# Patient Record
Sex: FEMALE | Race: WHITE | NOT HISPANIC OR LATINO | Employment: OTHER | URBAN - METROPOLITAN AREA
[De-identification: names, ages, dates, MRNs, and addresses within clinical notes are randomized per-mention and may not be internally consistent; named-entity substitution may affect disease eponyms.]

---

## 2019-06-20 ENCOUNTER — HOSPITAL ENCOUNTER (EMERGENCY)
Facility: HOSPITAL | Age: 39
Discharge: HOME | End: 2019-06-20
Attending: FAMILY MEDICINE
Payer: MEDICAID

## 2019-06-20 VITALS
SYSTOLIC BLOOD PRESSURE: 114 MMHG | WEIGHT: 130.94 LBS | DIASTOLIC BLOOD PRESSURE: 60 MMHG | RESPIRATION RATE: 18 BRPM | HEART RATE: 83 BPM | TEMPERATURE: 98 F | HEIGHT: 67 IN | OXYGEN SATURATION: 96 % | BODY MASS INDEX: 20.55 KG/M2

## 2019-06-20 DIAGNOSIS — Z20.2 STD EXPOSURE: ICD-10-CM

## 2019-06-20 DIAGNOSIS — J02.9 ACUTE SORE THROAT: Primary | ICD-10-CM

## 2019-06-20 DIAGNOSIS — Z11.4 ENCOUNTER FOR SCREENING FOR HIV: ICD-10-CM

## 2019-06-20 LAB — HIV1+2 IGG SERPL QL IA.RAPID: NEGATIVE

## 2019-06-20 PROCEDURE — 86703 HIV-1/HIV-2 1 RESULT ANTBDY: CPT

## 2019-06-20 PROCEDURE — 99283 EMERGENCY DEPT VISIT LOW MDM: CPT

## 2019-06-20 PROCEDURE — 86592 SYPHILIS TEST NON-TREP QUAL: CPT

## 2019-06-20 RX ORDER — HYDROXYZINE PAMOATE 25 MG/1
25 CAPSULE ORAL 4 TIMES DAILY
Qty: 30 CAPSULE | Refills: 0 | Status: ON HOLD | OUTPATIENT
Start: 2019-06-20 | End: 2022-02-03 | Stop reason: HOSPADM
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
## 2019-06-25 LAB — RPR SER QL: NORMAL

## 2022-01-25 ENCOUNTER — HOSPITAL ENCOUNTER (INPATIENT)
Facility: HOSPITAL | Age: 42
LOS: 10 days | Discharge: HOME | DRG: 885 | End: 2022-02-04
Attending: STUDENT IN AN ORGANIZED HEALTH CARE EDUCATION/TRAINING PROGRAM | Admitting: STUDENT IN AN ORGANIZED HEALTH CARE EDUCATION/TRAINING PROGRAM
Payer: MEDICAID

## 2022-01-25 ENCOUNTER — HOSPITAL ENCOUNTER (EMERGENCY)
Facility: HOSPITAL | Age: 42
Discharge: PSYCHIATRIC OR SUBSTANCE USE FACILITY | End: 2022-01-25
Attending: EMERGENCY MEDICINE
Payer: MEDICAID

## 2022-01-25 VITALS
DIASTOLIC BLOOD PRESSURE: 80 MMHG | BODY MASS INDEX: 21.19 KG/M2 | HEART RATE: 90 BPM | SYSTOLIC BLOOD PRESSURE: 142 MMHG | HEIGHT: 67 IN | WEIGHT: 135 LBS | OXYGEN SATURATION: 99 % | RESPIRATION RATE: 16 BRPM | TEMPERATURE: 98 F

## 2022-01-25 DIAGNOSIS — F29 PSYCHOSIS: ICD-10-CM

## 2022-01-25 DIAGNOSIS — F25.9 SCHIZOAFFECTIVE DISORDER, UNSPECIFIED TYPE: ICD-10-CM

## 2022-01-25 DIAGNOSIS — F25.0 SCHIZOAFFECTIVE DISORDER, BIPOLAR TYPE: Primary | ICD-10-CM

## 2022-01-25 DIAGNOSIS — E87.6 HYPOKALEMIA: ICD-10-CM

## 2022-01-25 DIAGNOSIS — F29 PSYCHOSIS, UNSPECIFIED PSYCHOSIS TYPE: ICD-10-CM

## 2022-01-25 DIAGNOSIS — F30.10 MANIC BEHAVIOR: ICD-10-CM

## 2022-01-25 DIAGNOSIS — Z00.8 MEDICAL CLEARANCE FOR PSYCHIATRIC ADMISSION: Primary | ICD-10-CM

## 2022-01-25 LAB
ALBUMIN SERPL BCP-MCNC: 3.9 G/DL (ref 3.5–5.2)
ALP SERPL-CCNC: 90 U/L (ref 55–135)
ALT SERPL W/O P-5'-P-CCNC: 14 U/L (ref 10–44)
AMPHET+METHAMPHET UR QL: NEGATIVE
ANION GAP SERPL CALC-SCNC: 12 MMOL/L (ref 8–16)
APAP SERPL-MCNC: <3 UG/ML (ref 10–20)
AST SERPL-CCNC: 27 U/L (ref 10–40)
BARBITURATES UR QL SCN>200 NG/ML: ABNORMAL
BASOPHILS # BLD AUTO: 0.04 K/UL (ref 0–0.2)
BASOPHILS NFR BLD: 0.5 % (ref 0–1.9)
BENZODIAZ UR QL SCN>200 NG/ML: NEGATIVE
BILIRUB SERPL-MCNC: 0.2 MG/DL (ref 0.1–1)
BUN SERPL-MCNC: 6 MG/DL (ref 6–20)
BZE UR QL SCN: NEGATIVE
CALCIUM SERPL-MCNC: 9 MG/DL (ref 8.7–10.5)
CANNABINOIDS UR QL SCN: ABNORMAL
CHLORIDE SERPL-SCNC: 104 MMOL/L (ref 95–110)
CO2 SERPL-SCNC: 24 MMOL/L (ref 23–29)
CREAT SERPL-MCNC: 0.7 MG/DL (ref 0.5–1.4)
CREAT UR-MCNC: 289 MG/DL (ref 15–325)
DIFFERENTIAL METHOD: ABNORMAL
EOSINOPHIL # BLD AUTO: 0.1 K/UL (ref 0–0.5)
EOSINOPHIL NFR BLD: 0.6 % (ref 0–8)
ERYTHROCYTE [DISTWIDTH] IN BLOOD BY AUTOMATED COUNT: 13.4 % (ref 11.5–14.5)
EST. GFR  (AFRICAN AMERICAN): >60 ML/MIN/1.73 M^2
EST. GFR  (NON AFRICAN AMERICAN): >60 ML/MIN/1.73 M^2
ETHANOL SERPL-MCNC: <10 MG/DL
GLUCOSE SERPL-MCNC: 122 MG/DL (ref 70–110)
HCT VFR BLD AUTO: 33.7 % (ref 37–48.5)
HCV AB SERPL QL IA: NEGATIVE
HGB BLD-MCNC: 11.3 G/DL (ref 12–16)
HIV 1+2 AB+HIV1 P24 AG SERPL QL IA: NEGATIVE
IMM GRANULOCYTES # BLD AUTO: 0.03 K/UL (ref 0–0.04)
IMM GRANULOCYTES NFR BLD AUTO: 0.3 % (ref 0–0.5)
LYMPHOCYTES # BLD AUTO: 2.4 K/UL (ref 1–4.8)
LYMPHOCYTES NFR BLD: 27.3 % (ref 18–48)
MCH RBC QN AUTO: 31.3 PG (ref 27–31)
MCHC RBC AUTO-ENTMCNC: 33.5 G/DL (ref 32–36)
MCV RBC AUTO: 93 FL (ref 82–98)
METHADONE UR QL SCN>300 NG/ML: NEGATIVE
MONOCYTES # BLD AUTO: 0.6 K/UL (ref 0.3–1)
MONOCYTES NFR BLD: 7 % (ref 4–15)
NEUTROPHILS # BLD AUTO: 5.6 K/UL (ref 1.8–7.7)
NEUTROPHILS NFR BLD: 64.3 % (ref 38–73)
NRBC BLD-RTO: 0 /100 WBC
OPIATES UR QL SCN: NEGATIVE
PCP UR QL SCN>25 NG/ML: NEGATIVE
PLATELET # BLD AUTO: 226 K/UL (ref 150–450)
PMV BLD AUTO: 10 FL (ref 9.2–12.9)
POTASSIUM SERPL-SCNC: 3 MMOL/L (ref 3.5–5.1)
PROT SERPL-MCNC: 6.2 G/DL (ref 6–8.4)
RBC # BLD AUTO: 3.61 M/UL (ref 4–5.4)
SALICYLATES SERPL-MCNC: <5 MG/DL (ref 15–30)
SARS-COV-2 RDRP RESP QL NAA+PROBE: NEGATIVE
SODIUM SERPL-SCNC: 140 MMOL/L (ref 136–145)
TOXICOLOGY INFORMATION: ABNORMAL
TSH SERPL DL<=0.005 MIU/L-ACNC: 2.37 UIU/ML (ref 0.4–4)
WBC # BLD AUTO: 8.71 K/UL (ref 3.9–12.7)

## 2022-01-25 PROCEDURE — 63600175 PHARM REV CODE 636 W HCPCS: Performed by: EMERGENCY MEDICINE

## 2022-01-25 PROCEDURE — 63700000 PHARM REV CODE 250 ALT 637 W/O HCPCS: Performed by: EMERGENCY MEDICINE

## 2022-01-25 PROCEDURE — 99205 OFFICE O/P NEW HI 60 MIN: CPT | Mod: AF,HB,95, | Performed by: PSYCHIATRY & NEUROLOGY

## 2022-01-25 PROCEDURE — 84443 ASSAY THYROID STIM HORMONE: CPT | Performed by: EMERGENCY MEDICINE

## 2022-01-25 PROCEDURE — 85025 COMPLETE CBC W/AUTO DIFF WBC: CPT | Performed by: EMERGENCY MEDICINE

## 2022-01-25 PROCEDURE — U0002 COVID-19 LAB TEST NON-CDC: HCPCS | Performed by: EMERGENCY MEDICINE

## 2022-01-25 PROCEDURE — 80143 DRUG ASSAY ACETAMINOPHEN: CPT | Performed by: EMERGENCY MEDICINE

## 2022-01-25 PROCEDURE — 80179 DRUG ASSAY SALICYLATE: CPT | Performed by: EMERGENCY MEDICINE

## 2022-01-25 PROCEDURE — 11400000 HC PSYCH PRIVATE ROOM

## 2022-01-25 PROCEDURE — 99205 PR OFFICE/OUTPT VISIT, NEW, LEVL V, 60-74 MIN: ICD-10-PCS | Mod: AF,HB,95, | Performed by: PSYCHIATRY & NEUROLOGY

## 2022-01-25 PROCEDURE — 86803 HEPATITIS C AB TEST: CPT | Performed by: EMERGENCY MEDICINE

## 2022-01-25 PROCEDURE — 80307 DRUG TEST PRSMV CHEM ANLYZR: CPT | Performed by: EMERGENCY MEDICINE

## 2022-01-25 PROCEDURE — 96372 THER/PROPH/DIAG INJ SC/IM: CPT

## 2022-01-25 PROCEDURE — 80053 COMPREHEN METABOLIC PANEL: CPT | Performed by: EMERGENCY MEDICINE

## 2022-01-25 PROCEDURE — 82077 ASSAY SPEC XCP UR&BREATH IA: CPT | Performed by: EMERGENCY MEDICINE

## 2022-01-25 PROCEDURE — 87389 HIV-1 AG W/HIV-1&-2 AB AG IA: CPT | Performed by: EMERGENCY MEDICINE

## 2022-01-25 PROCEDURE — 99285 EMERGENCY DEPT VISIT HI MDM: CPT | Mod: 25

## 2022-01-25 RX ORDER — DIPHENHYDRAMINE HYDROCHLORIDE 50 MG/ML
50 INJECTION INTRAMUSCULAR; INTRAVENOUS EVERY 4 HOURS PRN
Status: DISCONTINUED | OUTPATIENT
Start: 2022-01-25 | End: 2022-02-04 | Stop reason: HOSPADM

## 2022-01-25 RX ORDER — HYDROXYZINE HYDROCHLORIDE 25 MG/1
50 TABLET, FILM COATED ORAL NIGHTLY PRN
Status: DISCONTINUED | OUTPATIENT
Start: 2022-01-25 | End: 2022-01-26

## 2022-01-25 RX ORDER — LORAZEPAM 2 MG/ML
2 INJECTION INTRAMUSCULAR EVERY 4 HOURS PRN
Status: DISCONTINUED | OUTPATIENT
Start: 2022-01-25 | End: 2022-02-04 | Stop reason: HOSPADM

## 2022-01-25 RX ORDER — HALOPERIDOL 5 MG/1
5 TABLET ORAL EVERY 4 HOURS PRN
Status: DISCONTINUED | OUTPATIENT
Start: 2022-01-25 | End: 2022-01-28

## 2022-01-25 RX ORDER — ACETAMINOPHEN 325 MG/1
650 TABLET ORAL EVERY 6 HOURS PRN
Status: DISCONTINUED | OUTPATIENT
Start: 2022-01-25 | End: 2022-02-04 | Stop reason: HOSPADM

## 2022-01-25 RX ORDER — POTASSIUM CHLORIDE 20 MEQ/15ML
20 SOLUTION ORAL ONCE
Status: COMPLETED | OUTPATIENT
Start: 2022-01-25 | End: 2022-01-25

## 2022-01-25 RX ORDER — THIAMINE HCL 100 MG
100 TABLET ORAL DAILY
Status: DISCONTINUED | OUTPATIENT
Start: 2022-01-26 | End: 2022-02-04 | Stop reason: HOSPADM

## 2022-01-25 RX ORDER — DIPHENHYDRAMINE HCL 50 MG
50 CAPSULE ORAL EVERY 4 HOURS PRN
Status: DISCONTINUED | OUTPATIENT
Start: 2022-01-25 | End: 2022-02-04 | Stop reason: HOSPADM

## 2022-01-25 RX ORDER — FOLIC ACID 1 MG/1
1 TABLET ORAL DAILY
Status: DISCONTINUED | OUTPATIENT
Start: 2022-01-26 | End: 2022-02-04 | Stop reason: HOSPADM

## 2022-01-25 RX ORDER — MAG HYDROX/ALUMINUM HYD/SIMETH 200-200-20
30 SUSPENSION, ORAL (FINAL DOSE FORM) ORAL
Status: DISCONTINUED | OUTPATIENT
Start: 2022-01-25 | End: 2022-02-04 | Stop reason: HOSPADM

## 2022-01-25 RX ORDER — LORAZEPAM 2 MG/ML
1 INJECTION INTRAMUSCULAR
Status: COMPLETED | OUTPATIENT
Start: 2022-01-25 | End: 2022-01-25

## 2022-01-25 RX ORDER — LORAZEPAM 1 MG/1
2 TABLET ORAL EVERY 4 HOURS PRN
Status: DISCONTINUED | OUTPATIENT
Start: 2022-01-25 | End: 2022-02-04 | Stop reason: HOSPADM

## 2022-01-25 RX ORDER — HALOPERIDOL 5 MG/ML
5 INJECTION INTRAMUSCULAR
Status: COMPLETED | OUTPATIENT
Start: 2022-01-25 | End: 2022-01-25

## 2022-01-25 RX ORDER — ADHESIVE BANDAGE
30 BANDAGE TOPICAL DAILY PRN
Status: DISCONTINUED | OUTPATIENT
Start: 2022-01-25 | End: 2022-02-04 | Stop reason: HOSPADM

## 2022-01-25 RX ORDER — HALOPERIDOL 5 MG/ML
5 INJECTION INTRAMUSCULAR EVERY 4 HOURS PRN
Status: DISCONTINUED | OUTPATIENT
Start: 2022-01-25 | End: 2022-01-28

## 2022-01-25 RX ORDER — ZIPRASIDONE MESYLATE 20 MG/ML
20 INJECTION, POWDER, LYOPHILIZED, FOR SOLUTION INTRAMUSCULAR
Status: COMPLETED | OUTPATIENT
Start: 2022-01-25 | End: 2022-01-25

## 2022-01-25 RX ORDER — IBUPROFEN 200 MG
1 TABLET ORAL DAILY PRN
Status: DISCONTINUED | OUTPATIENT
Start: 2022-01-25 | End: 2022-01-27

## 2022-01-25 RX ADMIN — LORAZEPAM 1 MG: 2 INJECTION INTRAMUSCULAR; INTRAVENOUS at 01:01

## 2022-01-25 RX ADMIN — POTASSIUM CHLORIDE 20 MEQ: 20 SOLUTION ORAL at 01:01

## 2022-01-25 RX ADMIN — ZIPRASIDONE MESYLATE 20 MG: 20 INJECTION, POWDER, LYOPHILIZED, FOR SOLUTION INTRAMUSCULAR at 08:01

## 2022-01-25 RX ADMIN — HALOPERIDOL LACTATE 5 MG: 5 INJECTION, SOLUTION INTRAMUSCULAR at 01:01

## 2022-01-25 NOTE — ED PROVIDER NOTES
SCRIBE #1 NOTE: I, Kelsie Hendrickson, am scribing for, and in the presence of, Roni Ruiz MD. I have scribed the entire note.      History      Chief Complaint   Patient presents with    Psychiatric Evaluation     Found in manic state at [place] [facility] per EMS       Review of patient's allergies indicates:   Allergen Reactions    Latex, natural rubber Anaphylaxis    Sulfa (sulfonamide antibiotics)         HPI   HPI    2022, 8:29 AM   History obtained from the patient and EMS  HPI/ROS limited secondary to physiatric disorder      History of Present Illness: Umm Green is a 41 y.o. female patient with a PMHx of HLD, mental disorder, and schizoaffective disorder who presents to the Emergency Department for a psychiatric evaluation. Per EMS, the pt was found at a [place] [facility] in a manic state. The pt is rambling when she is asked questions.      Arrival mode: EMS    PCP: Mo David MD       Past Medical History:  Past Medical History:   Diagnosis Date    Hyperlipidemia     Mental disorder     Schizoaffective disorder        Past Surgical History:  Past Surgical History:   Procedure Laterality Date     SECTION      x1    MULTIPLE TOOTH EXTRACTIONS      X5         Family History:  No family history on file.    Social History:  Social History     Tobacco Use    Smoking status: Former Smoker     Quit date: 2014     Years since quittin.7    Smokeless tobacco: Never Used   Substance and Sexual Activity    Alcohol use: No    Drug use: No    Sexual activity: Yes     Partners: Male     Birth control/protection: None       ROS   Review of Systems   Unable to perform ROS: Psychiatric disorder     Physical Exam      Initial Vitals [22 0829]   BP Pulse Resp Temp SpO2   137/79 100 20 98.5 °F (36.9 °C) 100 %      MAP       --          Physical Exam  Nursing Notes and Vital Signs Reviewed.  Constitutional: Patient is in no acute distress. Well-developed and well-nourished.  Head:  "Atraumatic. Normocephalic.  Eyes: PERRL. EOM intact. Conjunctivae are not pale. No scleral icterus.  ENT: Mucous membranes are moist. Oropharynx is clear and symmetric.    Neck: Supple. Full ROM. No lymphadenopathy.  Cardiovascular: Regular rate. Regular rhythm. No murmurs, rubs, or gallops. Distal pulses are 2+ and symmetric.  Pulmonary/Chest: No respiratory distress. Clear to auscultation bilaterally. No wheezing or rales.  Abdominal: Soft and non-distended.  There is no tenderness.  No rebound, guarding, or rigidity.   Musculoskeletal: Moves all extremities. No obvious deformities. No edema.  Skin: Warm and dry.  Neurological:  Alert and awake.  Pressured speech.  No acute focal neurological deficits are appreciated.  Psychiatric: The pt is manic. Pressured speech. Pt has flight of ideas. Pt has tangential thought process.    ED Course    Procedures  ED Vital Signs:  Vitals:    01/25/22 0829   BP: 137/79   Pulse: 100   Resp: 20   Temp: 98.5 °F (36.9 °C)   TempSrc: Oral   SpO2: 100%   Weight: 61.2 kg (135 lb)   Height: 5' 7" (1.702 m)       Abnormal Lab Results:  Labs Reviewed   CBC W/ AUTO DIFFERENTIAL - Abnormal; Notable for the following components:       Result Value    RBC 3.61 (*)     Hemoglobin 11.3 (*)     Hematocrit 33.7 (*)     MCH 31.3 (*)     All other components within normal limits    Narrative:     Release to patient->Immediate   COMPREHENSIVE METABOLIC PANEL - Abnormal; Notable for the following components:    Potassium 3.0 (*)     Glucose 122 (*)     All other components within normal limits    Narrative:     Release to patient->Immediate   DRUG SCREEN PANEL, URINE EMERGENCY - Abnormal; Notable for the following components:    Barbiturate Screen, Ur Presumptive Positive (*)     THC Presumptive Positive (*)     All other components within normal limits    Narrative:     Specimen Source->Urine   SALICYLATE LEVEL - Abnormal; Notable for the following components:    Salicylate Lvl <5.0 (*)     All " other components within normal limits    Narrative:     Release to patient->Immediate   ACETAMINOPHEN LEVEL - Abnormal; Notable for the following components:    Acetaminophen (Tylenol), Serum <3.0 (*)     All other components within normal limits    Narrative:     Release to patient->Immediate   TSH    Narrative:     Release to patient->Immediate   ALCOHOL,MEDICAL (ETHANOL)    Narrative:     Release to patient->Immediate   HIV 1 / 2 ANTIBODY    Narrative:     Release to patient->Immediate   HEPATITIS C ANTIBODY    Narrative:     Release to patient->Immediate   HEP C VIRUS HOLD SPECIMEN        All Lab Results:  Results for orders placed or performed during the hospital encounter of 01/25/22   CBC Auto Differential   Result Value Ref Range    WBC 8.71 3.90 - 12.70 K/uL    RBC 3.61 (L) 4.00 - 5.40 M/uL    Hemoglobin 11.3 (L) 12.0 - 16.0 g/dL    Hematocrit 33.7 (L) 37.0 - 48.5 %    MCV 93 82 - 98 fL    MCH 31.3 (H) 27.0 - 31.0 pg    MCHC 33.5 32.0 - 36.0 g/dL    RDW 13.4 11.5 - 14.5 %    Platelets 226 150 - 450 K/uL    MPV 10.0 9.2 - 12.9 fL    Immature Granulocytes 0.3 0.0 - 0.5 %    Gran # (ANC) 5.6 1.8 - 7.7 K/uL    Immature Grans (Abs) 0.03 0.00 - 0.04 K/uL    Lymph # 2.4 1.0 - 4.8 K/uL    Mono # 0.6 0.3 - 1.0 K/uL    Eos # 0.1 0.0 - 0.5 K/uL    Baso # 0.04 0.00 - 0.20 K/uL    nRBC 0 0 /100 WBC    Gran % 64.3 38.0 - 73.0 %    Lymph % 27.3 18.0 - 48.0 %    Mono % 7.0 4.0 - 15.0 %    Eosinophil % 0.6 0.0 - 8.0 %    Basophil % 0.5 0.0 - 1.9 %    Differential Method Automated    Comprehensive Metabolic Panel   Result Value Ref Range    Sodium 140 136 - 145 mmol/L    Potassium 3.0 (L) 3.5 - 5.1 mmol/L    Chloride 104 95 - 110 mmol/L    CO2 24 23 - 29 mmol/L    Glucose 122 (H) 70 - 110 mg/dL    BUN 6 6 - 20 mg/dL    Creatinine 0.7 0.5 - 1.4 mg/dL    Calcium 9.0 8.7 - 10.5 mg/dL    Total Protein 6.2 6.0 - 8.4 g/dL    Albumin 3.9 3.5 - 5.2 g/dL    Total Bilirubin 0.2 0.1 - 1.0 mg/dL    Alkaline Phosphatase 90 55 - 135 U/L     AST 27 10 - 40 U/L    ALT 14 10 - 44 U/L    Anion Gap 12 8 - 16 mmol/L    eGFR if African American >60 >60 mL/min/1.73 m^2    eGFR if non African American >60 >60 mL/min/1.73 m^2   TSH   Result Value Ref Range    TSH 2.365 0.400 - 4.000 uIU/mL   Drug screen panel, in-house   Result Value Ref Range    Benzodiazepines Negative Negative    Methadone metabolites Negative Negative    Cocaine (Metab.) Negative Negative    Opiate Scrn, Ur Negative Negative    Barbiturate Screen, Ur Presumptive Positive (A) Negative    Amphetamine Screen, Ur Negative Negative    THC Presumptive Positive (A) Negative    Phencyclidine Negative Negative    Creatinine, Urine 289.0 15.0 - 325.0 mg/dL    Toxicology Information SEE COMMENT    Ethanol   Result Value Ref Range    Alcohol, Serum <10 <10 mg/dL   Salicylate Level   Result Value Ref Range    Salicylate Lvl <5.0 (L) 15.0 - 30.0 mg/dL   Acetaminophen Level   Result Value Ref Range    Acetaminophen (Tylenol), Serum <3.0 (L) 10.0 - 20.0 ug/mL   HIV 1/2 Ag/Ab (4th Gen)   Result Value Ref Range    HIV 1/2 Ag/Ab Negative Negative   Hepatitis C Antibody   Result Value Ref Range    Hepatitis C Ab Negative Negative         Imaging Results:  Imaging Results    None                 The Emergency Provider reviewed the vital signs and test results, which are outlined above.    ED Discussion   8:28 AM: The PEC hold has been issued by Dr. Ruiz at this time for psychosis and manic behavior.    12:03 PM: Pt has been medically cleared by Dr. Ruiz at this time. Reassessed pt at this time. Pt is resting comfortably and appears in no acute distress. There are no psychiatric services offered at this facility. D/w pt all pertinent ED information and plan to transfer to psychiatric facility for psychiatric treatment. Pt verbalizes understanding. Patient being transferred by [facility] for ongoing personal protection en route. Pt has been made aware of all risks and benefits associated with transfer,  including but not limited to death, MVC, loss of vital signs, and/or permanent disability. Benefits include ability to be treated at an inpatient psychiatric facility. Pt will be transported by personnel trained in CPR and CPI. Patient understands that there could be unforeseen motor vehicle accidents, inclement weather, or loss of vital signs that could result in potential death or permanent disability. All questions and complaints have been addressed at this time. Pt condition is stable at this time and is clear to transfer to psychiatric facility at this time.            ED Medication(s):  Medications   potassium chloride 10% oral solution 20 mEq (has no administration in time range)   ziprasidone injection 20 mg (20 mg Intramuscular Given 1/25/22 0854)           New Prescriptions    No medications on file         Medical Decision Making    Medical Decision Making:   Clinical Tests:   Lab Tests: Ordered and Reviewed           Scribe Attestation:   Scribe #1: I performed the above scribed service and the documentation accurately describes the services I performed. I attest to the accuracy of the note.    Attending:   Physician Attestation Statement for Scribe #1: I, Roni Ruiz MD, personally performed the services described in this documentation, as scribed by Kelsie Hendrickson, in my presence, and it is both accurate and complete.          Clinical Impression       ICD-10-CM ICD-9-CM   1. Medical clearance for psychiatric admission  Z00.8 V70.8   2. Psychosis, unspecified psychosis type  F29 298.9   3. Manic behavior  F30.10 296.00   4. Hypokalemia  E87.6 276.8       Disposition:   Disposition: Transferred  Condition: Stable         Roni Ruiz MD  01/25/22 6296

## 2022-01-25 NOTE — CONSULTS
"[facility]  Psychiatry  Telepsychiatry Consult Note    Please see previous notes:    Patient agreeable to consultation via telepsychiatry.    Tele-Consultation from Psychiatry started: 1/25/2022 at 11:37 AM  The chief complaint leading to psychiatric consultation is: psychosis  This consultation was requested by Dr Ruiz, the Emergency Department attending physician.  The location of the consulting psychiatrist is [place].  The patient location is  [facility] EMERGENCY DEPARTMENT   The patient arrived at the ED at: 0819    Also present with the patient at the time of the consultation: sitter    Patient Identification:   Umm Green is a 41 y.o. female.    Patient information was obtained from patient and past medical records.  Patient presented voluntarily to the Emergency Department by ambulance where the patient received see Ambulance Run Sheet prior to arrival.    Inpatient consult to Telemedicine - Psychiatry  Consult performed by: Tona Connor MD  Consult ordered by: Roni Ruiz MD        Subjective:     History of Present Illness:  Per ED MD: " Umm Green is a 41 y.o. female patient with a PMHx of HLD, mental disorder, and schizoaffective disorder who presents to the Emergency Department for a psychiatric evaluation. Per EMS, the pt was found at a [place] [facility] in a manic state. "    Patient is a 40 y/o female with PMH HLD and past psychiatric h/o schizoaffective d/o per chart BIB EMS for the above. Chart reviewed, received ziprasidone 20 mg IM @ 0854. UDS + barbiturates, THC. Per staff pt was escalating and unable to be redirected safely. On interview, pt sleeping, required 2 staff to awaken for interview. Speech largely unintellible, continually makes sign of the cross. States "I'm scared in this world...Please protect me and save me in this earth...I was not happy I was sad." Also says "I live in the house the one that's the [place] lam[place]." Became extremely tearful and unable to " continue talking, offered to call emergency contact Marissa Green for information and she agreed. Pt then laid back down and closed her eyes. Despite multiple attempts, the comprehensive psychiatric assessment was limited due to pts acute smiley / psychosis. Due to the patient's inability to logically or linearly participate in the psychiatric assessment, I reached out to:  (# on the EMR facesheet)   Marissa Green Step parent [phone]   Went to , left message.    Per extensive chart review, no psychiatric presentations noted, but bizarre behavior noted by other clinicians at times, OBGYN noted dx of schizoaffective d/o throughout pregnancy care.    Psychiatric History:   Previous Psychiatric Hospitalizations: unable to assess  Previous Medication Trials: per chart Ativan, vistaril, Ambien  Previous Suicide Attempts: unable to assess  History of Violence: unable to assess  History of Depression: unable to assess  History of Smiley: unable to assess  History of Auditory/Visual Hallucination unable to assess  History of Delusions: unable to assess  Outpatient psychiatrist (current & past): unable to assess    Substance Abuse History:  Tobacco:unable to assess  Alcohol: unable to assess  Illicit Substances:UDS + barbiturates, THC  Detox/Rehab: unable to assess    Legal History: Past charges/incarcerations: unable to assess     Family Psychiatric History: unable to assess      Social History:  Developmental/Childhood:unable to assess  *Education:unable to assess  Employment Status/Finances:unable to assess   Relationship Status/Sexual Orientation: unable to assess  Children: unable to assess  Housing Status: unable to assess    history:  unable to assess  Access to gun: unable to assess  Voodoo:unable to assess  Recreational activities:unable to assess    Psychiatric Mental Status Exam:  Arousal: awake  Sensorium/Orientation: oriented to self  Behavior/Cooperation: reluctant to participate   Speech:  "soft, mumbled, pressured  Language: grossly intact  Mood: "sad"  Affect: tearful  Thought Process: flight of ideas  Thought Content:   Auditory hallucinations: unable to assess  Visual hallucinations: unable to assess  Paranoia: unable to assess  Delusions:  hyperreligious  Suicidal ideation: unable to assess  Homicidal ideation: unable to assess  Attention/Concentration:  Easily distracted  Memory:    Recent: unable to assess   Remote: unable to assess  Fund of Knowledge: unable to assess  Abstract reasoning: unable to assess  Insight:poor  Judgment: poor      Past Medical History:   Past Medical History:   Diagnosis Date    Hyperlipidemia     Mental disorder     Schizoaffective disorder       Laboratory Data:   Labs Reviewed   CBC W/ AUTO DIFFERENTIAL - Abnormal; Notable for the following components:       Result Value    RBC 3.61 (*)     Hemoglobin 11.3 (*)     Hematocrit 33.7 (*)     MCH 31.3 (*)     All other components within normal limits    Narrative:     Release to patient->Immediate   COMPREHENSIVE METABOLIC PANEL - Abnormal; Notable for the following components:    Potassium 3.0 (*)     Glucose 122 (*)     All other components within normal limits    Narrative:     Release to patient->Immediate   DRUG SCREEN PANEL, URINE EMERGENCY - Abnormal; Notable for the following components:    Barbiturate Screen, Ur Presumptive Positive (*)     THC Presumptive Positive (*)     All other components within normal limits    Narrative:     Specimen Source->Urine   TSH    Narrative:     Release to patient->Immediate   HIV 1 / 2 ANTIBODY    Narrative:     Release to patient->Immediate   HEPATITIS C ANTIBODY    Narrative:     Release to patient->Immediate   ALCOHOL,MEDICAL (ETHANOL)   SALICYLATE LEVEL   ACETAMINOPHEN LEVEL   HEP C VIRUS HOLD SPECIMEN       Neurological History:  Seizures: Unable to assess  Head trauma: Unable to assess    Allergies:   Review of patient's allergies indicates:   Allergen Reactions    " Latex, natural rubber Anaphylaxis    Sulfa (sulfonamide antibiotics)        Medications in ER:   Medications   ziprasidone injection 20 mg (20 mg Intramuscular Given 1/25/22 7102)       Medications at home: Unable to assess. No fills last 12 mo per .    Assessment - Diagnosis - Goals:       IMPRESSION:   Schizoaffective d/o    RECOMMENDATIONS:     DISPOSITION: Once medically cleared;    Seek Involuntary Inpatient Psychiatric admission for stabilization of acute psychiatric symptoms and until a safe disposition plan is enacted. The pt was informed that the pt will be transferred to an Inpt unit per ED placement team.     PSYCHIATRIC MEDICATIONS  · Scheduled-defer to inpatient psychiatry  · PRN- Haldol 5mg + Benadryl 50mg + Ativan 2mg PO/IM q 6 for psychotic agitation. Would check EKG for QTc prior to administering.    LEGAL   Continue PEC because pt is gravely disabled. Please provide with 1:1 sitter.         Time with patient and chart: 30      More than 50% of the time was spent counseling/coordinating care    Consulting clinician was informed of the encounter and consult note.    Consultation ended: 1/25/2022 at 12:09 PM      Tona Connor MD   Psychiatry  [facility]

## 2022-01-26 PROBLEM — F12.10 MARIJUANA ABUSE: Status: ACTIVE | Noted: 2022-01-26

## 2022-01-26 LAB
ALBUMIN SERPL BCP-MCNC: 3.4 G/DL (ref 3.5–5.2)
ALP SERPL-CCNC: 95 U/L (ref 55–135)
ALT SERPL W/O P-5'-P-CCNC: 24 U/L (ref 10–44)
ANION GAP SERPL CALC-SCNC: 4 MMOL/L (ref 8–16)
AST SERPL-CCNC: 32 U/L (ref 10–40)
BILIRUB SERPL-MCNC: 0.2 MG/DL (ref 0.1–1)
BUN SERPL-MCNC: 9 MG/DL (ref 6–20)
CALCIUM SERPL-MCNC: 8.8 MG/DL (ref 8.7–10.5)
CHLORIDE SERPL-SCNC: 106 MMOL/L (ref 95–110)
CHOLEST SERPL-MCNC: 156 MG/DL (ref 120–199)
CHOLEST/HDLC SERPL: 2.8 {RATIO} (ref 2–5)
CO2 SERPL-SCNC: 31 MMOL/L (ref 23–29)
CREAT SERPL-MCNC: 0.7 MG/DL (ref 0.5–1.4)
EST. GFR  (AFRICAN AMERICAN): >60 ML/MIN/1.73 M^2
EST. GFR  (NON AFRICAN AMERICAN): >60 ML/MIN/1.73 M^2
ESTIMATED AVG GLUCOSE: 105 MG/DL (ref 68–131)
GLUCOSE SERPL-MCNC: 100 MG/DL (ref 70–110)
HBA1C MFR BLD: 5.3 % (ref 4–5.6)
HDLC SERPL-MCNC: 55 MG/DL (ref 40–75)
HDLC SERPL: 35.3 % (ref 20–50)
LDLC SERPL CALC-MCNC: 83 MG/DL (ref 63–159)
NONHDLC SERPL-MCNC: 101 MG/DL
POTASSIUM SERPL-SCNC: 3.6 MMOL/L (ref 3.5–5.1)
PROT SERPL-MCNC: 6.5 G/DL (ref 6–8.4)
SODIUM SERPL-SCNC: 141 MMOL/L (ref 136–145)
TRIGL SERPL-MCNC: 90 MG/DL (ref 30–150)

## 2022-01-26 PROCEDURE — 80053 COMPREHEN METABOLIC PANEL: CPT | Performed by: STUDENT IN AN ORGANIZED HEALTH CARE EDUCATION/TRAINING PROGRAM

## 2022-01-26 PROCEDURE — 99223 1ST HOSP IP/OBS HIGH 75: CPT | Mod: AF,HB,, | Performed by: STUDENT IN AN ORGANIZED HEALTH CARE EDUCATION/TRAINING PROGRAM

## 2022-01-26 PROCEDURE — 25000003 PHARM REV CODE 250: Performed by: INTERNAL MEDICINE

## 2022-01-26 PROCEDURE — 36415 COLL VENOUS BLD VENIPUNCTURE: CPT | Performed by: STUDENT IN AN ORGANIZED HEALTH CARE EDUCATION/TRAINING PROGRAM

## 2022-01-26 PROCEDURE — 90833 PR PSYCHOTHERAPY W/PATIENT W/E&M, 30 MIN (ADD ON): ICD-10-PCS | Mod: AF,HB,, | Performed by: STUDENT IN AN ORGANIZED HEALTH CARE EDUCATION/TRAINING PROGRAM

## 2022-01-26 PROCEDURE — 11400000 HC PSYCH PRIVATE ROOM

## 2022-01-26 PROCEDURE — 80061 LIPID PANEL: CPT | Performed by: STUDENT IN AN ORGANIZED HEALTH CARE EDUCATION/TRAINING PROGRAM

## 2022-01-26 PROCEDURE — 25000003 PHARM REV CODE 250: Performed by: STUDENT IN AN ORGANIZED HEALTH CARE EDUCATION/TRAINING PROGRAM

## 2022-01-26 PROCEDURE — 90833 PSYTX W PT W E/M 30 MIN: CPT | Mod: AF,HB,, | Performed by: STUDENT IN AN ORGANIZED HEALTH CARE EDUCATION/TRAINING PROGRAM

## 2022-01-26 PROCEDURE — S4991 NICOTINE PATCH NONLEGEND: HCPCS | Performed by: INTERNAL MEDICINE

## 2022-01-26 PROCEDURE — 99223 PR INITIAL HOSPITAL CARE,LEVL III: ICD-10-PCS | Mod: AF,HB,, | Performed by: STUDENT IN AN ORGANIZED HEALTH CARE EDUCATION/TRAINING PROGRAM

## 2022-01-26 PROCEDURE — 83036 HEMOGLOBIN GLYCOSYLATED A1C: CPT | Performed by: STUDENT IN AN ORGANIZED HEALTH CARE EDUCATION/TRAINING PROGRAM

## 2022-01-26 RX ORDER — CARBOXYMETHYLCELLULOSE SODIUM 5 MG/ML
2 SOLUTION/ DROPS OPHTHALMIC
Status: DISCONTINUED | OUTPATIENT
Start: 2022-01-26 | End: 2022-01-26

## 2022-01-26 RX ORDER — HYDROXYZINE HYDROCHLORIDE 25 MG/1
50 TABLET, FILM COATED ORAL EVERY 4 HOURS PRN
Status: DISCONTINUED | OUTPATIENT
Start: 2022-01-26 | End: 2022-01-29

## 2022-01-26 RX ORDER — TETRAHYDROZOLINE HCL 0.05 %
1 DROPS OPHTHALMIC (EYE) EVERY 8 HOURS PRN
Status: DISCONTINUED | OUTPATIENT
Start: 2022-01-26 | End: 2022-01-26

## 2022-01-26 RX ORDER — ARIPIPRAZOLE 5 MG/1
5 TABLET ORAL DAILY
Status: DISCONTINUED | OUTPATIENT
Start: 2022-01-26 | End: 2022-01-27

## 2022-01-26 RX ADMIN — THIAMINE HCL TAB 100 MG 100 MG: 100 TAB at 08:01

## 2022-01-26 RX ADMIN — ARIPIPRAZOLE 5 MG: 5 TABLET ORAL at 10:01

## 2022-01-26 RX ADMIN — FOLIC ACID 1 MG: 1 TABLET ORAL at 08:01

## 2022-01-26 RX ADMIN — ACETAMINOPHEN 650 MG: 325 TABLET ORAL at 08:01

## 2022-01-26 RX ADMIN — NICOTINE 1 PATCH: 14 PATCH TRANSDERMAL at 01:01

## 2022-01-26 RX ADMIN — THERA TABS 1 TABLET: TAB at 08:01

## 2022-01-26 RX ADMIN — HYDROXYZINE HYDROCHLORIDE 50 MG: 25 TABLET, FILM COATED ORAL at 08:01

## 2022-01-26 NOTE — PLAN OF CARE
"POC reviewed this shift and is on going. Patient is calm, cooperative, quiet, and pacing.  Denies Suicidal Ideation, Homicidal Ideation, Auditory Hallucinations, Visual Hallucinations, Tactile Hallucinations, Gustatory Hallucinations, and Delusions. Patient has been out on the floor and pacing. Patient comes up to the nurses station every 15 minutes asking the same question, "can I call my friend to come pick me up". This RN has to tell her, you are not leaving today and she says ok. Pt continues to be medication compliant and staff will continue to monitor pt closely while on the unit.    "

## 2022-01-26 NOTE — H&P
[facility]) Hospital Medicine  History & Physical    Patient Name: Umm Green  MRN: [id number]  Patient Class: IP- Psych  Admission Date: 2022  Attending Physician: Romaine Akhtar III, MD   Primary Care Provider: Mo David MD         Patient information was obtained from ER records.     Subjective:     Principal Problem:<principal problem not specified>    Chief Complaint: No chief complaint on file.       HPI:   Chief Complaint   Patient presents with    Psychiatric Evaluation       Found in manic state at [facility] per EMS              Review of patient's allergies indicates:   Allergen Reactions    Latex, natural rubber Anaphylaxis    Sulfa (sulfonamide antibiotics)           HPI   HPI     2022, 8:29 AM   History obtained from the patient and EMS  HPI/ROS limited secondary to physiatric disorder                  History of Present Illness: Umm Green is a 41 y.o. female patient with a PMHx of HLD, mental disorder, and schizoaffective disorder who presents to the Emergency Department for a psychiatric evaluation. Per EMS, the pt was found at a [place] [facility] in a manic state. The pt is rambling when she is asked questions.        Arrival mode: EMS     PCP: Mo David MD          Past Medical History:   Diagnosis Date    Hyperlipidemia     Mental disorder     Schizoaffective disorder        Past Surgical History:   Procedure Laterality Date     SECTION      x1    MULTIPLE TOOTH EXTRACTIONS      X5       Review of patient's allergies indicates:   Allergen Reactions    Latex, natural rubber Anaphylaxis    Sulfa (sulfonamide antibiotics)        Current Facility-Administered Medications on File Prior to Encounter   Medication    [COMPLETED] haloperidol lactate injection 5 mg    [COMPLETED] lorazepam injection 1 mg    [COMPLETED] potassium chloride 10% oral solution 20 mEq     Current Outpatient Medications on File Prior to Encounter    Medication Sig    hydrOXYzine pamoate (VISTARIL) 25 MG Cap Take 1 capsule (25 mg total) by mouth 4 (four) times daily.    lorazepam (ATIVAN) 2 MG Tab Take 1 tablet (2 mg total) by mouth every 12 (twelve) hours as needed.     Family History    None       Tobacco Use    Smoking status: Former Smoker     Quit date: 2014     Years since quittin.7    Smokeless tobacco: Never Used   Substance and Sexual Activity    Alcohol use: No    Drug use: No    Sexual activity: Yes     Partners: Male     Birth control/protection: None     Review of Systems   Constitutional: Negative for fatigue and fever.   HENT: Negative for congestion, ear pain and sore throat.    Eyes: Negative for pain and discharge.   Respiratory: Negative for cough, shortness of breath and wheezing.    Gastrointestinal: Negative for abdominal pain, constipation, diarrhea, nausea and vomiting.   Endocrine: Negative for cold intolerance and heat intolerance.   Genitourinary: Negative for difficulty urinating, dysuria and frequency.   Musculoskeletal: Negative for arthralgias.   Allergic/Immunologic: Negative for environmental allergies.   Neurological: Negative for dizziness, tremors and seizures.   Psychiatric/Behavioral: Positive for behavioral problems, dysphoric mood and sleep disturbance.   All other systems reviewed and are negative.    Objective:     Vital Signs (Most Recent):  Temp: 98 °F (36.7 °C) (22 0740)  Pulse: (!) 57 (22 0740)  Resp: 20 (22 0740)  BP: (!) 106/53 (22 0740)  SpO2: 99 % (22 0729) Vital Signs (24h Range):  Temp:  [97.9 °F (36.6 °C)-98.2 °F (36.8 °C)] 98 °F (36.7 °C)  Pulse:  [57-90] 57  Resp:  [16-20] 20  SpO2:  [97 %-99 %] 99 %  BP: (106-142)/(53-80) 106/53     Weight: 60.8 kg (134 lb)  Body mass index is 20.99 kg/m².    Physical Exam  Vitals and nursing note reviewed.   Constitutional:       Appearance: Normal appearance.   HENT:      Head: Normocephalic and atraumatic.      Nose: Nose  normal.      Mouth/Throat:      Mouth: Mucous membranes are moist.      Pharynx: Oropharynx is clear.   Eyes:      Extraocular Movements: Extraocular movements intact.      Conjunctiva/sclera: Conjunctivae normal.      Pupils: Pupils are equal, round, and reactive to light.   Cardiovascular:      Rate and Rhythm: Normal rate and regular rhythm.      Pulses: Normal pulses.      Heart sounds: Normal heart sounds.   Pulmonary:      Effort: Pulmonary effort is normal.      Breath sounds: Normal breath sounds.   Abdominal:      General: Abdomen is flat. Bowel sounds are normal.      Palpations: Abdomen is soft.   Musculoskeletal:         General: Normal range of motion.      Cervical back: Normal range of motion and neck supple.   Skin:     General: Skin is warm and dry.      Capillary Refill: Capillary refill takes less than 2 seconds.      Comments: No rashes on limited skin exam.   Neurological:      General: No focal deficit present.      Mental Status: She is alert and oriented to person, place, and time.      Cranial Nerves: No cranial nerve deficit.      Comments: I Olfactory:  Sense of smell intact    II Optic:  Pupils equal round react to light.  Vision intact.    III, IV, VI, Ocular motor, Trochlear, Abducens:  Extraocular movements intact    V Trigeminal:  Facial sensation intact facial sensation intact,, muscles of mastication intact muscles of mastication intact, corneal reflex intact, corneal reflex intact    VII Facial:  Muscles of facial expression intact     VIII Vestibular cochlear: Hearing intact vestibular cochlear: Hearing intact    IX Glossopharyngeal:  Gag reflex intact.  Tasting intact.     X Vagus:  Gag reflex intact.    XI Spinal Accessory:  Shoulder shrug intact.  Head rotation intact.    XII Hypoglossal:  Tongue movements intact.     Psychiatric:      Comments: Patient appears manic with pressured speech and FOI           CRANIAL NERVES     CN III, IV, VI   Pupils are equal, round, and  reactive to light.       Significant Labs: All pertinent labs within the past 24 hours have been reviewed.    Significant Imaging: I have reviewed all pertinent imaging results/findings within the past 24 hours.    Assessment/Plan:     Marijuana abuse  Recommend cessation      Schizoaffective disorder  To be admitted to our inpatient psychiatric unit for further evaluation and management.        Tobacco smoking complicating pregnancy in second trimester  Patient counseled on health consequences associated with tobacco abuse.  She endorses understanding.  Nicotine patch will be provided if necessary as to avoid cravings.          VTE Risk Mitigation (From admission, onward)    None             Cornelio Watkins Jr, MD  Department of Hospital Medicine   [facility] ([facility])

## 2022-01-26 NOTE — ASSESSMENT & PLAN NOTE
Patient counseled on health consequences associated with tobacco abuse.  She endorses understanding.  Nicotine patch will be provided if necessary as to avoid cravings.

## 2022-01-26 NOTE — PLAN OF CARE
41 year old female admitted from [facility] [place], [place]  With a history of Schizoaffective disorder and a medical history of Hyperlipidemia. Pt. Has pressured speech upon arrival and c/o auditory and visual hallucinations at times.   UDS  noted to be positive for Barbiturate and THC. Pt. Denied any c/o suicidal or visual hallucinations. Pt. C/o feeling sleepy at this time and was unable to stay awake at this time. Pt. Was oriented to unit, room and unit rules.
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
## 2022-01-26 NOTE — PLAN OF CARE
Step parent, Marissa  [phone]  Left voicemail.   Returned phone call. 15:20    Per Marissa, she haven't seen patient in awhile. She is patient's step mother. States that patient is a sweet lady normally. Her mom is  and unaware where her father is.     Patient has SSI and money from death of mom was in a trust with an .       Lorie Weiner (Aunt) Relative [phone]    Per Lorie, Patient contacted her the other night and started screaming and talking; unable to understand patient. Unsure who can help patient, haven't seen patient in two years. Patient is always manic, the times she has been around her. In the past, patient has had her own home. Patient had a child and gave her up for adoption. Diagnosed as bi-polar and doesn't take medications. Prior to patient's mother passing, she would try to ensure that she gets medications. Bizarre behaviors such as climbing on shelves in [employer] and jumped, but patient is afraid of heights.      Patient has lost several relationship due to bizarre behaviors.    Patient has lots of dogs and cats living in her home. Patient is currently in a relationship with someone by the name of Ramesh Luna. Patient ex- is Esteban Vásquez, but is no longer together with him.     Patient needs help and states that she feels that she's unable to live alone if her boyfriend isn't there.   Patient receives disability and someone with stated is over her monthly funds that she receives every so often since the death of her mom.

## 2022-01-26 NOTE — PLAN OF CARE
Patient signed collateral contact form to speak with (Lorie Orellana, and Marissa) listed on contact list.

## 2022-01-26 NOTE — SUBJECTIVE & OBJECTIVE
Past Medical History:   Diagnosis Date    Hyperlipidemia     Mental disorder     Schizoaffective disorder        Past Surgical History:   Procedure Laterality Date     SECTION      x1    MULTIPLE TOOTH EXTRACTIONS      X5       Review of patient's allergies indicates:   Allergen Reactions    Latex, natural rubber Anaphylaxis    Sulfa (sulfonamide antibiotics)        Current Facility-Administered Medications on File Prior to Encounter   Medication    [COMPLETED] haloperidol lactate injection 5 mg    [COMPLETED] lorazepam injection 1 mg    [COMPLETED] potassium chloride 10% oral solution 20 mEq     Current Outpatient Medications on File Prior to Encounter   Medication Sig    hydrOXYzine pamoate (VISTARIL) 25 MG Cap Take 1 capsule (25 mg total) by mouth 4 (four) times daily.    lorazepam (ATIVAN) 2 MG Tab Take 1 tablet (2 mg total) by mouth every 12 (twelve) hours as needed.     Family History    None       Tobacco Use    Smoking status: Former Smoker     Quit date: 2014     Years since quittin.7    Smokeless tobacco: Never Used   Substance and Sexual Activity    Alcohol use: No    Drug use: No    Sexual activity: Yes     Partners: Male     Birth control/protection: None     Review of Systems   Constitutional: Negative for fatigue and fever.   HENT: Negative for congestion, ear pain and sore throat.    Eyes: Negative for pain and discharge.   Respiratory: Negative for cough, shortness of breath and wheezing.    Gastrointestinal: Negative for abdominal pain, constipation, diarrhea, nausea and vomiting.   Endocrine: Negative for cold intolerance and heat intolerance.   Genitourinary: Negative for difficulty urinating, dysuria and frequency.   Musculoskeletal: Negative for arthralgias.   Allergic/Immunologic: Negative for environmental allergies.   Neurological: Negative for dizziness, tremors and seizures.   Psychiatric/Behavioral: Positive for behavioral problems, dysphoric mood and  sleep disturbance.   All other systems reviewed and are negative.    Objective:     Vital Signs (Most Recent):  Temp: 98 °F (36.7 °C) (01/26/22 0740)  Pulse: (!) 57 (01/26/22 0740)  Resp: 20 (01/26/22 0740)  BP: (!) 106/53 (01/26/22 0740)  SpO2: 99 % (01/26/22 0729) Vital Signs (24h Range):  Temp:  [97.9 °F (36.6 °C)-98.2 °F (36.8 °C)] 98 °F (36.7 °C)  Pulse:  [57-90] 57  Resp:  [16-20] 20  SpO2:  [97 %-99 %] 99 %  BP: (106-142)/(53-80) 106/53     Weight: 60.8 kg (134 lb)  Body mass index is 20.99 kg/m².    Physical Exam  Vitals and nursing note reviewed.   Constitutional:       Appearance: Normal appearance.   HENT:      Head: Normocephalic and atraumatic.      Nose: Nose normal.      Mouth/Throat:      Mouth: Mucous membranes are moist.      Pharynx: Oropharynx is clear.   Eyes:      Extraocular Movements: Extraocular movements intact.      Conjunctiva/sclera: Conjunctivae normal.      Pupils: Pupils are equal, round, and reactive to light.   Cardiovascular:      Rate and Rhythm: Normal rate and regular rhythm.      Pulses: Normal pulses.      Heart sounds: Normal heart sounds.   Pulmonary:      Effort: Pulmonary effort is normal.      Breath sounds: Normal breath sounds.   Abdominal:      General: Abdomen is flat. Bowel sounds are normal.      Palpations: Abdomen is soft.   Musculoskeletal:         General: Normal range of motion.      Cervical back: Normal range of motion and neck supple.   Skin:     General: Skin is warm and dry.      Capillary Refill: Capillary refill takes less than 2 seconds.      Comments: No rashes on limited skin exam.   Neurological:      General: No focal deficit present.      Mental Status: She is alert and oriented to person, place, and time.      Cranial Nerves: No cranial nerve deficit.      Comments: I Olfactory:  Sense of smell intact    II Optic:  Pupils equal round react to light.  Vision intact.    III, IV, VI, Ocular motor, Trochlear, Abducens:  Extraocular movements  intact    V Trigeminal:  Facial sensation intact facial sensation intact,, muscles of mastication intact muscles of mastication intact, corneal reflex intact, corneal reflex intact    VII Facial:  Muscles of facial expression intact     VIII Vestibular cochlear: Hearing intact vestibular cochlear: Hearing intact    IX Glossopharyngeal:  Gag reflex intact.  Tasting intact.     X Vagus:  Gag reflex intact.    XI Spinal Accessory:  Shoulder shrug intact.  Head rotation intact.    XII Hypoglossal:  Tongue movements intact.     Psychiatric:      Comments: Patient appears manic with pressured speech and FOI           CRANIAL NERVES     CN III, IV, VI   Pupils are equal, round, and reactive to light.       Significant Labs: All pertinent labs within the past 24 hours have been reviewed.    Significant Imaging: I have reviewed all pertinent imaging results/findings within the past 24 hours.

## 2022-01-26 NOTE — ED NOTES
Belongings given to [facility]:   Black tennis shoes  Black jacket  Top   Pants  $160, car keys, debit card

## 2022-01-26 NOTE — PLAN OF CARE
Patient given Challenging Negative thoughts worksheet.Intervention focused on questionnaires to help assess irrational thoughts.     Plan  Follow up with patient on 1/27/22.

## 2022-01-26 NOTE — HPI
Chief Complaint   Patient presents with    Psychiatric Evaluation       Found in manic state at [place] [facility] per EMS              Review of patient's allergies indicates:   Allergen Reactions    Latex, natural rubber Anaphylaxis    Sulfa (sulfonamide antibiotics)           HPI   HPI     1/25/2022, 8:29 AM   History obtained from the patient and EMS  HPI/ROS limited secondary to physiatric disorder                  History of Present Illness: Umm Green is a 41 y.o. female patient with a PMHx of HLD, mental disorder, and schizoaffective disorder who presents to the Emergency Department for a psychiatric evaluation. Per EMS, the pt was found at [facility] in a manic state. The pt is rambling when she is asked questions.        Arrival mode: EMS     PCP: Mo David MD

## 2022-01-27 PROCEDURE — 90833 PSYTX W PT W E/M 30 MIN: CPT | Mod: AF,HB,, | Performed by: STUDENT IN AN ORGANIZED HEALTH CARE EDUCATION/TRAINING PROGRAM

## 2022-01-27 PROCEDURE — 25000003 PHARM REV CODE 250: Performed by: INTERNAL MEDICINE

## 2022-01-27 PROCEDURE — 11400000 HC PSYCH PRIVATE ROOM

## 2022-01-27 PROCEDURE — 90833 PR PSYCHOTHERAPY W/PATIENT W/E&M, 30 MIN (ADD ON): ICD-10-PCS | Mod: AF,HB,, | Performed by: STUDENT IN AN ORGANIZED HEALTH CARE EDUCATION/TRAINING PROGRAM

## 2022-01-27 PROCEDURE — 99233 PR SUBSEQUENT HOSPITAL CARE,LEVL III: ICD-10-PCS | Mod: AF,HB,, | Performed by: STUDENT IN AN ORGANIZED HEALTH CARE EDUCATION/TRAINING PROGRAM

## 2022-01-27 PROCEDURE — 99233 SBSQ HOSP IP/OBS HIGH 50: CPT | Mod: AF,HB,, | Performed by: STUDENT IN AN ORGANIZED HEALTH CARE EDUCATION/TRAINING PROGRAM

## 2022-01-27 PROCEDURE — 25000003 PHARM REV CODE 250: Performed by: STUDENT IN AN ORGANIZED HEALTH CARE EDUCATION/TRAINING PROGRAM

## 2022-01-27 RX ORDER — ARIPIPRAZOLE 10 MG/1
10 TABLET ORAL DAILY
Status: DISCONTINUED | OUTPATIENT
Start: 2022-01-28 | End: 2022-01-28

## 2022-01-27 RX ORDER — LORAZEPAM 0.5 MG/1
0.5 TABLET ORAL ONCE AS NEEDED
Status: COMPLETED | OUTPATIENT
Start: 2022-01-27 | End: 2022-01-27

## 2022-01-27 RX ORDER — MICONAZOLE NITRATE 2 %
2 CREAM (GRAM) TOPICAL EVERY 4 HOURS PRN
Status: DISCONTINUED | OUTPATIENT
Start: 2022-01-27 | End: 2022-02-04 | Stop reason: HOSPADM

## 2022-01-27 RX ORDER — TRAZODONE HYDROCHLORIDE 100 MG/1
100 TABLET ORAL NIGHTLY
Status: DISCONTINUED | OUTPATIENT
Start: 2022-01-27 | End: 2022-01-29

## 2022-01-27 RX ADMIN — TRAZODONE HYDROCHLORIDE 100 MG: 100 TABLET ORAL at 08:01

## 2022-01-27 RX ADMIN — HYDROXYZINE HYDROCHLORIDE 50 MG: 25 TABLET, FILM COATED ORAL at 08:01

## 2022-01-27 RX ADMIN — THERA TABS 1 TABLET: TAB at 08:01

## 2022-01-27 RX ADMIN — ACETAMINOPHEN 650 MG: 325 TABLET ORAL at 02:01

## 2022-01-27 RX ADMIN — ARIPIPRAZOLE 5 MG: 5 TABLET ORAL at 08:01

## 2022-01-27 RX ADMIN — NICOTINE POLACRILEX 2 MG: 2 GUM, CHEWING BUCCAL at 09:01

## 2022-01-27 RX ADMIN — FOLIC ACID 1 MG: 1 TABLET ORAL at 08:01

## 2022-01-27 RX ADMIN — THIAMINE HCL TAB 100 MG 100 MG: 100 TAB at 08:01

## 2022-01-27 RX ADMIN — LORAZEPAM 0.5 MG: 0.5 TABLET ORAL at 11:01

## 2022-01-27 NOTE — PROGRESS NOTES
"PSYCHIATRY DAILY INPATIENT PROGRESS NOTE  SUBSEQUENT HOSPITAL VISIT    ENCOUNTER DATE: 1/27/2022  SITE: [facility]    DATE OF ADMISSION: 1/25/2022 11:30 PM  LENGTH OF STAY: 2 days      CHIEF COMPLAINT   Umm Green is a 41 y.o. female, seen during daily stone rounds on the inpatient unit.  Umm Green presented with the chief complaint of psychosis      The patient was seen and examined. The chart was reviewed.     Reviewed notes from Rns, MD and SW and labs from the last 24 hours.    The patient's case was discussed with the treatment team/care providers today including Rns, CTRS and SW    Staff reports some behavioral or management issues.     The patient has been compliant with treatment.      Subjective 01/27/2022       Today the patient reports "the medication is being tampered with."    Continues to report delusions about needing to support veterans in the hospital and bringing her dad to a parade for alzheimer's.     The patient denies any side effects to medications.        Interim/overnight events per report/notes:   Patient has been out on the floor and pacing. Patient comes up to the nurses station every 15 minutes asking the same question, "can I call my friend to come pick me up". This RN has to tell her, you are not leaving today and she says ok. Pt continues to be medication compliant and staff will continue to monitor pt closely while on the unit.             Psychiatric ROS (observed, reported, or endorsed/denied):  Depressed mood - less  Interest/pleasure/anhedonia: less  Guilt/hopelessness/worthlessness - less  Changes in Sleep - Continuing  Changes in Appetite - less  Changes in Concentration - Continuing  Changes in Energy - Continuing  PMA/R- Continuing  Suicidal- active/passive ideations - denies  Homicidal ideations: active/passive ideations - denies    Hallucinations - less  Delusions - Continuing  Disorganized behavior - Continuing  Disorganized speech - Continuing  Negative " symptoms - Continuing    Elevated mood - None  Decreased need for sleep - Continuing  Grandiosity - None  Racing thoughts - denies  Impulsivity - denies  Irritability- denies  Increased energy - denies  Distractibility - Continuing  Increase in goal-directed activity or PMA- Continuing    Symptoms of DEWAYNE - Continuing  Symptoms of Panic Disorder- None  Symptoms of PTSD - None        Overall progress: Patient is showing no improvement on the Unit to date        Psychotherapy:  · Target symptoms: mood disorder, psychosis  · Why chosen therapy is appropriate versus another modality: relevant to diagnosis  · Outcome monitoring methods: self-report  · Therapeutic intervention type: supportive psychotherapy  · Topics discussed/themes: educational, building skills sets for symptom management, symptom recognition  · The patient's response to the intervention is accepting. The patient's progress toward treatment goals is limited.   · Duration of intervention: 16 minutes.        Collateral: obtained by CORIN dialMarissa           [phone]  Left voicemail.   Returned phone call. 15:20     Per Marissa, she haven't seen patient in awhile. She is patient's step mother. States that patient is a sweet lady normally. Her mom is  and unaware where her father is.      Patient has SSI and money from death of mom was in a trust with an .         Lorie Weiner (Aunt)           Relative          [phone]     Per Lorie, Patient contacted her the other night and started screaming and talking; unable to understand patient. Unsure who can help patient, haven't seen patient in two years. Patient is always manic, the times she has been around her. In the past, patient has had her own home. Patient had a child and gave her up for adoption. Diagnosed as bi-polar and doesn't take medications. Prior to patient's mother passing, she would try to ensure that she gets medications. Bizarre behaviors such as climbing on  shelves in [employer] and jumped, but patient is afraid of heights.        Patient has lost several relationship due to bizarre behaviors.     Patient has lots of dogs and cats living in her home. Patient is currently in a relationship with someone by the name of Ramesh Luna. Patient ex- is Esteban Vásquez, but is no longer together with him.      Patient needs help and states that she feels that she's unable to live alone if her boyfriend isn't there.   Patient receives disability and someone with stated is over her monthly funds that she receives every so often since the death of her mom        Medical ROS  Review of Systems   Constitutional: Negative for chills and fever.   HENT: Negative for hearing loss and tinnitus.    Eyes: Negative for blurred vision and double vision.   Respiratory: Negative for shortness of breath.    Cardiovascular: Negative for chest pain.   Gastrointestinal: Negative for nausea and vomiting.   Genitourinary: Negative for dysuria.   Musculoskeletal: Negative for back pain and neck pain.   Skin: Negative for rash.   Neurological: Negative for dizziness and headaches.   Endo/Heme/Allergies: Negative for environmental allergies.         PAST MEDICAL HISTORY   Past Medical History:   Diagnosis Date    Hyperlipidemia     Mental disorder     Schizoaffective disorder            PSYCHOTROPIC MEDICATIONS   Scheduled Meds:   ARIPiprazole  5 mg Oral Daily    folic acid  1 mg Oral Daily    multivitamin  1 tablet Oral Daily    thiamine  100 mg Oral Daily     Continuous Infusions:  PRN Meds:.acetaminophen, aluminum-magnesium hydroxide-simethicone, haloperidoL **AND** diphenhydrAMINE **AND** LORazepam **AND** haloperidol lactate **AND** diphenhydrAMINE **AND** lorazepam, hydrOXYzine HCL, influenza, magnesium hydroxide 400 mg/5 ml, nicotine        EXAMINATION    VITALS   Vitals:    01/26/22 0729 01/26/22 0740 01/26/22 1915 01/27/22 0713   BP: (!) 106/53 (!) 106/53 (!) 109/55 104/64   BP  "Location: Left arm   Left arm   Patient Position: Sitting   Sitting   Pulse: (!) 57 (!) 57 60 (!) 50   Resp: 20 20 20 20   Temp: 98 °F (36.7 °C) 98 °F (36.7 °C) 97.7 °F (36.5 °C) 97.5 °F (36.4 °C)   TempSrc: Oral   Oral   SpO2: 99%  99% 100%   Weight:  60.8 kg (134 lb)     Height:  5' 7" (1.702 m)         Body mass index is 20.99 kg/m².        CONSTITUTIONAL  General Appearance: unremarkable, age appropriate    MUSCULOSKELETAL  Muscle Strength and Tone:no tremor, no tic  Abnormal Involuntary Movements: No  Gait and Station: non-ataxic    PSYCHIATRIC   Level of Consciousness: awake and alert   Orientation: person, place and situation  Grooming: Casually dressed and Well groomed  Psychomotor Behavior: normal, cooperative, pacing at times  Speech: normal tone, normal rate, normal pitch, normal volume  Language: grossly intact  Mood: anxious  Affect: Consistent with mood  Thought Process: circumferential  Associations: intact   Thought Content: DENIES suicidal ideation, DENIES homicidal ideation and +delusions  Perceptions: less AVH  Memory: Able to recall past events, Remote intact and Recent intact  Attention: Attends to interview without distraction  Fund of Knowledge: Aware of current events and Vocabulary appropriate   Estimate if Intelligence:  Average based on work/education history, vocabulary and mental status exam  Insight: limited awareness of illness  Judgment: behavior is adequate to circumstances        DIAGNOSTIC TESTING   Laboratory Results  No results found for this or any previous visit (from the past 24 hour(s)).             MEDICAL DECISION MAKING          ASSESSMENT         Schizoaffective disorder, MRE depressed, acute exacerbation, severe  Insomnia due to another mental disorder  Cannabis abuse  Psychosocial stressors     Hypokalemia     H/o HLD           PROBLEM LIST AND MANAGEMENT PLANS     Schizoaffective disorder, MRE depressed, acute exacerbation, severe  - started abilify 5 mg PO qd  " -increase to 10 mg PO qd tomorrow  - start ativan 0.5 mg PO once PRN for anxiety  - pt counseled  - follow up with outpatient mental health provider after discharge     Insomnia due to another mental disorder  - start trazodone 100 mg qhs   - started hydroxyzine 50 mg PO q 6 hours PRN  - pt counseled  - follow up with outpatient mental health provider after discharge     Cannabis abuse  - SW consulted for assistance with additional resources   - pt counseled  - follow up with outpatient mental health provider after discharge     Psychosocial stressors  - SW consulted for assistance with additional resources   - pt counseled  - follow up with outpatient mental health provider after discharge     Hypokalemia  - replaced IV in ED  - rechechecked this morning, resolved  - monitor  - FM consulted     H/o HLD  - lipid panel checked, WNL  - FM consulted          Discussed diagnosis, risks and benefits of proposed treatment vs alternative treatments vs no treatment, potential side effects of these treatments and the inherent unpredictability of treatment. The patient expresses understanding of the above and displays the capacity to agree with this treatment given said understanding. Patient also agrees that, currently, the benefits outweigh the risks and would like to pursue/continue treatment at this time.      DISCHARGE PLANNING  Expected Disposition Plan: Home or Self Care      NEED FOR CONTINUED HOSPITALIZATION  Psychiatric illness continues to pose a potential threat to life or bodily function, of self or others, thereby requiring the need for continued inpatient psychiatric hospitalization: Yes, due to: gravely disabled, as evidenced by:  Ongoing concerns with perceptual aberrancy and paranoid persecutory delusions leading to potential harm of self or others.    Protective inpatient pyschiatric hospitalization required while a safe disposition plan is enacted: Yes    Patient stabilized and ready for discharge from  inpatient psychiatric unit: No        STAFF:   Romaine Akhtar III, MD  Psychiatry

## 2022-01-27 NOTE — PLAN OF CARE
"    Behavior: Patient calm and cooperative during group session.      Intervention: Psychotherapy group; Patient given Challenging Negative thoughts worksheet.Intervention focused on questionnaires to help assess irrational thoughts.       Response:   Negative Thought: "Too much going in life and I am unable to handle everything."  Is there substantial evidence for my thought? Patient states that she feels yes due to having a lot going on at the moment and from her past. States that she's looking for her  Akil whose diagnosed with alztimers.  Is there evidence contrary to my thought? Patient states yes since her mom raised her different to be strong and not allow men and people to walk over here. States that she allows homeless people to live with her and her mom would never allow for it to happen prior to passing.   Am I attempting to interpret this situation without all the evidence? Patient stated "no."  What would a friend think about this situation? No longer has friends since getting with Akil. States Akil has anger issues which is why she given up her baby with him, because he was equipped to be a father.  If I look at the situation positively, how is it different? "I know that I can get through it."  Will this matter a year from now, five years from now? Patient stated "yes."      Plan: SW will continue to encourage patient to attend psychotherapy group.   "

## 2022-01-27 NOTE — PLAN OF CARE
POC reviewed this shift and is on going.\    Pt cooperative and interacting with peers. Pt took meds with no adverse reaction. Pt is intrusive and continues to ask for ativan which she do not have a md order for. Staff continues to redirects pt back to her with coloring.

## 2022-01-28 PROCEDURE — 99232 SBSQ HOSP IP/OBS MODERATE 35: CPT | Mod: SA,HB,, | Performed by: PSYCHIATRY & NEUROLOGY

## 2022-01-28 PROCEDURE — 90833 PR PSYCHOTHERAPY W/PATIENT W/E&M, 30 MIN (ADD ON): ICD-10-PCS | Mod: SA,HB,, | Performed by: PSYCHIATRY & NEUROLOGY

## 2022-01-28 PROCEDURE — 25000003 PHARM REV CODE 250: Performed by: INTERNAL MEDICINE

## 2022-01-28 PROCEDURE — 90833 PSYTX W PT W E/M 30 MIN: CPT | Mod: SA,HB,, | Performed by: PSYCHIATRY & NEUROLOGY

## 2022-01-28 PROCEDURE — 25000003 PHARM REV CODE 250: Performed by: STUDENT IN AN ORGANIZED HEALTH CARE EDUCATION/TRAINING PROGRAM

## 2022-01-28 PROCEDURE — 99232 PR SUBSEQUENT HOSPITAL CARE,LEVL II: ICD-10-PCS | Mod: SA,HB,, | Performed by: PSYCHIATRY & NEUROLOGY

## 2022-01-28 PROCEDURE — 11400000 HC PSYCH PRIVATE ROOM

## 2022-01-28 RX ADMIN — NICOTINE POLACRILEX 2 MG: 2 GUM, CHEWING BUCCAL at 01:01

## 2022-01-28 RX ADMIN — HYDROXYZINE HYDROCHLORIDE 50 MG: 25 TABLET, FILM COATED ORAL at 08:01

## 2022-01-28 RX ADMIN — THIAMINE HCL TAB 100 MG 100 MG: 100 TAB at 08:01

## 2022-01-28 RX ADMIN — FOLIC ACID 1 MG: 1 TABLET ORAL at 08:01

## 2022-01-28 RX ADMIN — ARIPIPRAZOLE 10 MG: 10 TABLET ORAL at 08:01

## 2022-01-28 RX ADMIN — THERA TABS 1 TABLET: TAB at 08:01

## 2022-01-28 RX ADMIN — NICOTINE POLACRILEX 2 MG: 2 GUM, CHEWING BUCCAL at 08:01

## 2022-01-28 RX ADMIN — TRAZODONE HYDROCHLORIDE 100 MG: 100 TABLET ORAL at 08:01

## 2022-01-28 NOTE — PLAN OF CARE
"Patient is calm, hyper Methodist, delusional at times, disorganized thoughts with flight of ideas  but remains cooperative. She is friendly and socializes with peers throughout the day wants to be helpful, can be intrusive at times. Patient playing Monopoly today with selected peer, paces halls at intervals, watches tv. Patient is preoccupied with locating "her spiritual  Mr. Luna". " I am looking for a job at the [employer]." Patient stated she has an ex  and is not legally  to Mr. Luna who has Alzheimers. Appetite is good and medication complaint without side effects noted. Denies S/HI, A/VH. No negative behaviors Jv Pearson NP visited with new order  to increase dosage Abilify to 15 mg daily. Patient is sitting in the dining room at this time reading newspaper article out loud for other peers. Close observations and safe environment maintained.  "

## 2022-01-28 NOTE — PLAN OF CARE
POC reviewed this shift and is on going.  Pt cooperative with staff and interacted with peers. Pt is able to make needs known. Meds admin with no adverse reactions. Pt slept during the night with several interruptions where as she comes to the nursing station for a snack. Staff refused her explaining that snacks are one  time a shift and they were given more than their share at snack time. Staff will continue to monitor for safety and changes.

## 2022-01-28 NOTE — PROGRESS NOTES
"PSYCHIATRY DAILY INPATIENT PROGRESS NOTE  SUBSEQUENT HOSPITAL VISIT    ENCOUNTER DATE: 1/28/2022  SITE: [facility]    DATE OF ADMISSION: 1/25/2022 11:30 PM  LENGTH OF STAY: 3 days    CHIEF COMPLAINT   Umm Green is a 41 y.o. female, seen during daily stone rounds on the inpatient unit.  Umm Green presented with the chief complaint of psychosis    The patient was seen and examined. The chart was reviewed.     Reviewed notes from Rns and MD and labs from the last 24 hours.    The patient's case was discussed with the treatment team/care providers today including Rns and MD    Staff reports no behavioral or management issues.     The patient has been compliant with treatment.    Subjective 01/28/2022    Patient reports that she is doing fine, states "I am blessed by the blood of the lamb." Makes numerous unprompted statements about being a Jehova's Witness. Though she denies anxiety at this time, the patient is requesting Ativan, stating "When I became a Jehova's Witness I gave up all long-acting medications, but I find short acting medications are very helpful."     Discussed with patient the prospect of long acting injectable, she is agreeable. Will likely further increase abilify over the next few days before initiating an injection.     The patient denies any side effects to medications.    Psychiatric ROS (observed, reported, or endorsed/denied):  Depressed mood - denies  Interest/pleasure/anhedonia: denies  Guilt/hopelessness/worthlessness - denies  Changes in Sleep - denies  Changes in Appetite - denies  Changes in Concentration - denies  Changes in Energy - denies  PMA/R- denies  Suicidal- active/passive ideations - denies  Homicidal ideations: active/passive ideations - denies    Hallucinations - denies  Delusions - denies  Disorganized behavior - denies  Disorganized speech - Continuing  Negative symptoms - None    Elevated mood - less  Decreased need for sleep - less  Grandiosity - " None  Racing thoughts - less  Impulsivity - less  Irritability- less  Increased energy - less  Distractibility - Continuing  Increase in goal-directed activity or PMA- less    Symptoms of DEWAYNE - denies  Symptoms of Panic Disorder- denies  Symptoms of PTSD - denies    Overall progress: Patient is showing mild improvement     Psychotherapy:  · Target symptoms: psychosis  · Why chosen therapy is appropriate versus another modality: relevant to diagnosis  · Outcome monitoring methods: self-report, observation  · Therapeutic intervention type: supportive psychotherapy  · Topics discussed/themes: building skills sets for symptom management, symptom recognition  · The patient's response to the intervention is accepting. The patient's progress toward treatment goals is limited.   · Duration of intervention: 16 minutes.    Medical ROS  Review of Systems   Constitutional: Negative.    HENT: Negative.    Eyes: Negative.    Respiratory: Negative.    Cardiovascular: Negative.    Gastrointestinal: Negative.    Genitourinary: Negative.    Musculoskeletal: Negative.    Skin: Negative.    Neurological: Negative.    Endo/Heme/Allergies: Negative.    Psychiatric/Behavioral:        As noted       PAST MEDICAL HISTORY   Past Medical History:   Diagnosis Date    Hyperlipidemia     Mental disorder     Schizoaffective disorder        PSYCHOTROPIC MEDICATIONS   Scheduled Meds:   ARIPiprazole  10 mg Oral Daily    folic acid  1 mg Oral Daily    multivitamin  1 tablet Oral Daily    thiamine  100 mg Oral Daily    traZODone  100 mg Oral QHS     Continuous Infusions:  PRN Meds:.acetaminophen, aluminum-magnesium hydroxide-simethicone, artificial tears ointment, haloperidoL **AND** diphenhydrAMINE **AND** LORazepam **AND** haloperidol lactate **AND** diphenhydrAMINE **AND** lorazepam, hydrOXYzine HCL, influenza, magnesium hydroxide 400 mg/5 ml, nicotine (polacrilex)    EXAMINATION    VITALS   Vitals:    01/26/22 1915 01/27/22 0713 01/27/22  "1913 01/28/22 0700   BP: (!) 109/55 104/64 (!) 91/46 (!) 118/53   BP Location:  Left arm Left arm Right arm   Patient Position:  Sitting Sitting Sitting   Pulse: 60 (!) 50 (!) 52 (!) 53   Resp: 20 20 20 18   Temp: 97.7 °F (36.5 °C) 97.5 °F (36.4 °C) 97.9 °F (36.6 °C) 97.9 °F (36.6 °C)   TempSrc:  Oral Oral Oral   SpO2: 99% 100% 100% 100%   Weight:       Height:           Body mass index is 20.99 kg/m².    CONSTITUTIONAL  General Appearance: unremarkable, age appropriate    MUSCULOSKELETAL  Muscle Strength and Tone:no tremor, no tic  Abnormal Involuntary Movements: No  Gait and Station: non-ataxic    PSYCHIATRIC   Level of Consciousness: awake and alert   Orientation: person, place and situation  Grooming: Disheveled and Hospital garb  Psychomotor Behavior: normal, cooperative  Speech: normal tone, normal pitch, normal volume, rate somewhat increased  Language: grossly intact  Mood: "Good"  Affect: Consistent with mood and Congruent with thought  Thought Process: tangential  Associations: intact   Thought Content: DENIES suicidal ideation, DENIES homicidal ideation and no delusions  Perceptions: denies hallucinations  Memory: Able to recall past events, Remote intact and Recent intact  Attention:Attends to interview without distraction  Fund of Knowledge: Aware of current events and Vocabulary appropriate   Estimate if Intelligence:  Average based on work/education history, vocabulary and mental status exam  Insight: limited awareness of illness  Judgment: behavior is adequate to circumstances    DIAGNOSTIC TESTING   Laboratory Results  No results found for this or any previous visit (from the past 24 hour(s)).    MEDICAL DECISION MAKING     ASSESSMENT         Schizoaffective disorder, MRE depressed, acute exacerbation, severe  Insomnia due to another mental disorder  Cannabis abuse  Psychosocial stressors     Hypokalemia     H/o HLD           PROBLEM LIST AND MANAGEMENT PLANS     Schizoaffective disorder, MRE " depressed, acute exacerbation, severe  - Continue abilify 10 mg, increase to 15 mg daily tomorrow 1/29/22  - Continue ativan 0.5 mg PO once PRN for anxiety  - pt counseled  - follow up with outpatient mental health provider after discharge     Insomnia due to another mental disorder  - Continue trazodone 100 mg qhs   - Continue  hydroxyzine 50 mg PO q 6 hours PRN  - pt counseled  - follow up with outpatient mental health provider after discharge     Cannabis abuse  - SW consulted for assistance with additional resources   - pt counseled  - follow up with outpatient mental health provider after discharge     Psychosocial stressors  - SW consulted for assistance with additional resources   - pt counseled  - follow up with outpatient mental health provider after discharge     Hypokalemia  - monitor  - FM consulted     H/o HLD  - lipid panel checked, WNL  - FM consulted     Discussed diagnosis, risks and benefits of proposed treatment vs alternative treatments vs no treatment, potential side effects of these treatments and the inherent unpredictability of treatment. The patient expresses understanding of the above and displays the capacity to agree with this treatment given said understanding. Patient also agrees that, currently, the benefits outweigh the risks and would like to pursue/continue treatment at this time.        DISCHARGE PLANNING  Expected Disposition Plan: Home or Self Care        NEED FOR CONTINUED HOSPITALIZATION  Psychiatric illness continues to pose a potential threat to life or bodily function, of self or others, thereby requiring the need for continued inpatient psychiatric hospitalization: Yes, due to: gravely disabled, as evidenced by:  Ongoing concerns with perceptual aberrancy and paranoid persecutory delusions leading to potential harm of self or others.     Protective inpatient pyschiatric hospitalization required while a safe disposition plan is enacted: Yes     Patient stabilized and ready  for discharge from inpatient psychiatric unit: NoSTAFF:   Jv Pearson, NP  Psychiatry

## 2022-01-28 NOTE — PLAN OF CARE
1:1 Session   Behavior: Patient calm and cooperative.     Intervention:  Cognitive behavioral therapy focused on The Cognitive Model. How a persons thoughts, emotions, and behaviors are connected to situations that one may experience. Intervention also focused on The Fight or Flight Response; reviewed what it is?, What the symptoms are?, How is it triggered?, Is it a bad thing?, and/or if it can be managed?    Response: Patient stated that it's a lot that she has to work on after reviewing interventions.     Plan: SW will continue to encourage patient to attend psychotherapy group.

## 2022-01-29 PROCEDURE — 11400000 HC PSYCH PRIVATE ROOM

## 2022-01-29 PROCEDURE — 25000003 PHARM REV CODE 250: Performed by: INTERNAL MEDICINE

## 2022-01-29 PROCEDURE — 25000003 PHARM REV CODE 250: Performed by: PSYCHIATRY & NEUROLOGY

## 2022-01-29 PROCEDURE — 99232 SBSQ HOSP IP/OBS MODERATE 35: CPT | Mod: AF,HB,, | Performed by: PSYCHIATRY & NEUROLOGY

## 2022-01-29 PROCEDURE — 25000003 PHARM REV CODE 250: Performed by: STUDENT IN AN ORGANIZED HEALTH CARE EDUCATION/TRAINING PROGRAM

## 2022-01-29 PROCEDURE — 99232 PR SUBSEQUENT HOSPITAL CARE,LEVL II: ICD-10-PCS | Mod: AF,HB,, | Performed by: PSYCHIATRY & NEUROLOGY

## 2022-01-29 RX ORDER — HYDROXYZINE HYDROCHLORIDE 25 MG/1
50 TABLET, FILM COATED ORAL EVERY 4 HOURS PRN
Status: DISCONTINUED | OUTPATIENT
Start: 2022-01-29 | End: 2022-02-04 | Stop reason: HOSPADM

## 2022-01-29 RX ORDER — DIPHENHYDRAMINE HCL 50 MG
50 CAPSULE ORAL NIGHTLY PRN
Status: CANCELLED | OUTPATIENT
Start: 2022-01-29

## 2022-01-29 RX ORDER — DIPHENHYDRAMINE HCL 50 MG
50 CAPSULE ORAL NIGHTLY PRN
Status: DISCONTINUED | OUTPATIENT
Start: 2022-01-29 | End: 2022-02-04 | Stop reason: HOSPADM

## 2022-01-29 RX ADMIN — HYDROXYZINE HYDROCHLORIDE 50 MG: 25 TABLET, FILM COATED ORAL at 03:01

## 2022-01-29 RX ADMIN — NICOTINE POLACRILEX 2 MG: 2 GUM, CHEWING BUCCAL at 04:01

## 2022-01-29 RX ADMIN — TRAZODONE HYDROCHLORIDE 150 MG: 50 TABLET ORAL at 08:01

## 2022-01-29 RX ADMIN — HYDROXYZINE HYDROCHLORIDE 50 MG: 25 TABLET, FILM COATED ORAL at 12:01

## 2022-01-29 RX ADMIN — ARIPIPRAZOLE 15 MG: 10 TABLET ORAL at 08:01

## 2022-01-29 RX ADMIN — NICOTINE POLACRILEX 2 MG: 2 GUM, CHEWING BUCCAL at 08:01

## 2022-01-29 RX ADMIN — THERA TABS 1 TABLET: TAB at 08:01

## 2022-01-29 RX ADMIN — FOLIC ACID 1 MG: 1 TABLET ORAL at 08:01

## 2022-01-29 RX ADMIN — HYDROXYZINE HYDROCHLORIDE 50 MG: 25 TABLET, FILM COATED ORAL at 08:01

## 2022-01-29 RX ADMIN — ACETAMINOPHEN 650 MG: 325 TABLET ORAL at 08:01

## 2022-01-29 RX ADMIN — ACETAMINOPHEN 650 MG: 325 TABLET ORAL at 02:01

## 2022-01-29 RX ADMIN — THIAMINE HCL TAB 100 MG 100 MG: 100 TAB at 08:01

## 2022-01-29 NOTE — PROGRESS NOTES
"PSYCHIATRY DAILY INPATIENT PROGRESS NOTE  SUBSEQUENT HOSPITAL VISIT    ENCOUNTER DATE: 2022  SITE: [facility]    DATE OF ADMISSION: 2022 11:30 PM  LENGTH OF STAY: 4 days    CHIEF COMPLAINT   Umm Green is a 41 y.o. female, seen during daily stone rounds on the inpatient unit.  Umm Green presented with the chief complaint of psychosis    The patient was seen and examined. The chart was reviewed.     Reviewed notes from Rns and MD and labs from the last 24 hours.    The patient's case was discussed with the treatment team/care providers today including Rns    Staff reports no behavioral or management issues.     The patient has been compliant with treatment.    Interim events:  Pt was calm and cooperative. She states her mood was "fine". She states she was "anxious" last night and was offered benadryl, ativan, and haldol which she refused. She states that she refused bc mother  in a psychiatric unit after receiving haldol so she will not take it. Staff reports pt was agitated last night and only slept 4 hours. She was religiously preoccupied in session.  She is tolerating the increase in abilify from yesterday. She reports good appetite. Denies SI/HI/AVH.    The patient denies any side effects to medications.    Psychiatric ROS (observed, reported, or endorsed/denied):  Depressed mood - denies  Interest/pleasure/anhedonia: denies  Guilt/hopelessness/worthlessness - denies  Changes in Sleep - Continuing  Changes in Appetite - denies  Changes in Concentration - denies  Changes in Energy - denies  PMA/R- denies  Suicidal- active/passive ideations - denies  Homicidal ideations: active/passive ideations - denies    Hallucinations - denies  Delusions - denies  Disorganized behavior - denies  Disorganized speech - Continuing  Negative symptoms - None    Elevated mood - less  Decreased need for sleep - less  Grandiosity - None  Racing thoughts - less  Impulsivity - less  Irritability- " less  Increased energy - less  Distractibility - Continuing  Increase in goal-directed activity or PMA- less    Symptoms of DEWAYNE - denies  Symptoms of Panic Disorder- denies  Symptoms of PTSD - denies    Overall progress: Patient is showing mild improvement       Medical ROS  Review of Systems   Constitutional: Negative.    HENT: Negative.    Eyes: Negative.    Respiratory: Negative.    Cardiovascular: Negative.    Gastrointestinal: Negative.    Genitourinary: Negative.    Musculoskeletal: Negative.    Skin: Negative.    Neurological: Negative.    Endo/Heme/Allergies: Negative.    Psychiatric/Behavioral:        As noted       PAST MEDICAL HISTORY   Past Medical History:   Diagnosis Date    Hyperlipidemia     Mental disorder     Schizoaffective disorder        PSYCHOTROPIC MEDICATIONS   Scheduled Meds:   ARIPiprazole  15 mg Oral Daily    folic acid  1 mg Oral Daily    multivitamin  1 tablet Oral Daily    thiamine  100 mg Oral Daily    traZODone  150 mg Oral QHS     Continuous Infusions:  PRN Meds:.acetaminophen, aluminum-magnesium hydroxide-simethicone, artificial tears ointment, [DISCONTINUED] haloperidoL **AND** diphenhydrAMINE **AND** LORazepam **AND** [DISCONTINUED] haloperidol lactate **AND** diphenhydrAMINE **AND** lorazepam, diphenhydrAMINE, hydrOXYzine HCL, influenza, magnesium hydroxide 400 mg/5 ml, nicotine (polacrilex)    EXAMINATION    VITALS   Vitals:    01/28/22 0700 01/28/22 0800 01/28/22 1912 01/29/22 0718   BP: (!) 118/53 (!) 118/53 111/64 (!) 105/53   BP Location: Right arm  Left arm Left arm   Patient Position: Sitting  Sitting Sitting   Pulse: (!) 53 (!) 53 (!) 55 (!) 53   Resp: 18 18 20 18   Temp: 97.9 °F (36.6 °C) 97.9 °F (36.6 °C) 98.4 °F (36.9 °C) 97.1 °F (36.2 °C)   TempSrc: Oral  Oral Oral   SpO2: 100%  98% 99%   Weight:       Height:           Body mass index is 20.99 kg/m².    CONSTITUTIONAL  General Appearance: unremarkable, age appropriate    MUSCULOSKELETAL  Muscle Strength and  "Tone:no tremor, no tic  Abnormal Involuntary Movements: No  Gait and Station: non-ataxic    PSYCHIATRIC   Level of Consciousness: awake and alert   Orientation: person, place and situation  Grooming: Disheveled and Hospital garb  Psychomotor Behavior: normal, cooperative  Speech: normal tone, normal pitch, normal volume, rate somewhat increased  Language: grossly intact  Mood: "Good"  Affect: Consistent with mood and Congruent with thought  Thought Process: tangential  Associations: intact   Thought Content: DENIES suicidal ideation, DENIES homicidal ideation and no delusions  Perceptions: denies hallucinations  Memory: Able to recall past events, Remote intact and Recent intact  Attention:Attends to interview without distraction  Fund of Knowledge: Aware of current events and Vocabulary appropriate   Estimate if Intelligence:  Average based on work/education history, vocabulary and mental status exam  Insight: limited awareness of illness  Judgment: behavior is adequate to circumstances    DIAGNOSTIC TESTING   Laboratory Results  No results found for this or any previous visit (from the past 24 hour(s)).    MEDICAL DECISION MAKING     ASSESSMENT         Schizoaffective disorder, MRE depressed, acute exacerbation, severe  Insomnia due to another mental disorder  Cannabis abuse  Psychosocial stressors     Hypokalemia     H/o HLD           PROBLEM LIST AND MANAGEMENT PLANS     Schizoaffective disorder, MRE depressed, acute exacerbation, severe  - Continue abilify 15 mg daily first dose this am, 1/29/22  - Continue ativan 0.5 mg PO once PRN for anxiety  - pt counseled  - follow up with outpatient mental health provider after discharge     Insomnia due to another mental disorder  - increase trazodone to 150 mg qhs   - Continue  hydroxyzine 50 mg PO q 6 hours PRN  - pt counseled  - follow up with outpatient mental health provider after discharge     Cannabis abuse  - SW consulted for assistance with additional " resources   - pt counseled  - follow up with outpatient mental health provider after discharge     Psychosocial stressors  - SW consulted for assistance with additional resources   - pt counseled  - follow up with outpatient mental health provider after discharge     Hypokalemia  - monitor  - FM consulted     H/o HLD  - lipid panel checked, WNL  - FM consulted     Discussed diagnosis, risks and benefits of proposed treatment vs alternative treatments vs no treatment, potential side effects of these treatments and the inherent unpredictability of treatment. The patient expresses understanding of the above and displays the capacity to agree with this treatment given said understanding. Patient also agrees that, currently, the benefits outweigh the risks and would like to pursue/continue treatment at this time.        DISCHARGE PLANNING  Expected Disposition Plan: Home or Self Care        NEED FOR CONTINUED HOSPITALIZATION  Psychiatric illness continues to pose a potential threat to life or bodily function, of self or others, thereby requiring the need for continued inpatient psychiatric hospitalization: Yes, due to: gravely disabled, as evidenced by:  Ongoing concerns with perceptual aberrancy and paranoid persecutory delusions leading to potential harm of self or others.     Protective inpatient pyschiatric hospitalization required while a safe disposition plan is enacted: Yes     Patient stabilized and ready for discharge from inpatient psychiatric unit: Analy:   Robb Badillo md  Psychiatry  Time 25min  More than 50% of that time spent on chart review, formulation of healthcare plan, examination and discussion with patient and healthcare team.

## 2022-01-29 NOTE — PLAN OF CARE
"POC reviewed and is ongoing.    Pt hyperverbal, rambling, hyper Scientologist, intrusive in peers' business as well as staff affairs.  Attempted to give pt a po cocktail of Haldol and Ativan.  She refused Haldol, stating, "Oh, I can't take that.  I go into anaphylactic shock."  Writer cancelled administration of the entire cocktail based on this, and discontinued the Haldol order based on her stating that she is allergic to it.    Pt continues to ask, "Did Akosua RAMIREZ call?"  When asked who this person is, pt chuckles and states, "Well, you'll find out."  Minutes later, she stated that this person, "is my ex-boyfriend's mom."  When asked if the lady knows if pt is hospitalized, pt stated, "She'll be calling around for me.  I don't think she knows yet."    She denies S/HI or A/VH.  Will maintain close observation.   "

## 2022-01-29 NOTE — PLAN OF CARE
Patient slept only 4 hours.  Patient out a bed a few times through out the night with complaints of not being able to go to sleep.  Patient continues to say things that just don't make since.  Obsessed about the roommate and the dogs left behind. Patient prompted to return to bed each time.  She remained cooperative with staff request each time.  No further concerns.

## 2022-01-29 NOTE — PSYCH
Patient is anxious, fidgety, hyperverbal with delusional thoughts and flight of ideas,  intrusive but easily redirected and remains cooperative. Took shower this am. To dining room for meals with good appetite. Patient is medication compliant without side effects noted. Denies S/HI, A/VH. No negative behaviors. Interacts with selective peers ad mini. Dr. Badillo visited per telemed with new orders to increase dosage Trazodone to 150 mg po HS, Benadryl 50 mg po HS PRN, Hydroxyzine 50 mg po q 4 hrs PRN anxiety. PRN Hydroxyzine administered as ordered at 1300 for anxiety and noted effective. Patient is lying in her bed resting at this time awake. Close observations continued and safe environment maintained.

## 2022-01-30 LAB — SARS-COV-2 RDRP RESP QL NAA+PROBE: NEGATIVE

## 2022-01-30 PROCEDURE — 11400000 HC PSYCH PRIVATE ROOM

## 2022-01-30 PROCEDURE — 99231 SBSQ HOSP IP/OBS SF/LOW 25: CPT | Mod: AF,HB,, | Performed by: PSYCHIATRY & NEUROLOGY

## 2022-01-30 PROCEDURE — 25000003 PHARM REV CODE 250: Performed by: INTERNAL MEDICINE

## 2022-01-30 PROCEDURE — 25000003 PHARM REV CODE 250: Performed by: PSYCHIATRY & NEUROLOGY

## 2022-01-30 PROCEDURE — 99231 PR SUBSEQUENT HOSPITAL CARE,LEVL I: ICD-10-PCS | Mod: AF,HB,, | Performed by: PSYCHIATRY & NEUROLOGY

## 2022-01-30 PROCEDURE — 25000003 PHARM REV CODE 250: Performed by: STUDENT IN AN ORGANIZED HEALTH CARE EDUCATION/TRAINING PROGRAM

## 2022-01-30 PROCEDURE — U0002 COVID-19 LAB TEST NON-CDC: HCPCS | Performed by: STUDENT IN AN ORGANIZED HEALTH CARE EDUCATION/TRAINING PROGRAM

## 2022-01-30 RX ADMIN — HYDROXYZINE HYDROCHLORIDE 50 MG: 25 TABLET, FILM COATED ORAL at 08:01

## 2022-01-30 RX ADMIN — ARIPIPRAZOLE 15 MG: 10 TABLET ORAL at 08:01

## 2022-01-30 RX ADMIN — THERA TABS 1 TABLET: TAB at 08:01

## 2022-01-30 RX ADMIN — THIAMINE HCL TAB 100 MG 100 MG: 100 TAB at 08:01

## 2022-01-30 RX ADMIN — NICOTINE POLACRILEX 2 MG: 2 GUM, CHEWING BUCCAL at 12:01

## 2022-01-30 RX ADMIN — DIPHENHYDRAMINE HCL 50 MG: 50 CAPSULE ORAL at 12:01

## 2022-01-30 RX ADMIN — ACETAMINOPHEN 650 MG: 325 TABLET ORAL at 08:01

## 2022-01-30 RX ADMIN — NICOTINE POLACRILEX 2 MG: 2 GUM, CHEWING BUCCAL at 08:01

## 2022-01-30 RX ADMIN — FOLIC ACID 1 MG: 1 TABLET ORAL at 08:01

## 2022-01-30 RX ADMIN — TRAZODONE HYDROCHLORIDE 150 MG: 50 TABLET ORAL at 08:01

## 2022-01-30 RX ADMIN — LORAZEPAM 2 MG: 1 TABLET ORAL at 01:01

## 2022-01-30 RX ADMIN — HYDROXYZINE HYDROCHLORIDE 50 MG: 25 TABLET, FILM COATED ORAL at 03:01

## 2022-01-30 NOTE — PLAN OF CARE
"POC reviewed and is ongoing.    Pt remains delusional, intrusive to peers and staff, hyper Religion, even grandiose @ times.  Namely, stating, "I'm the youngest  in the state.  I had 5 special needs children @ one time.  AND I was taking care of my spiritual  @ the same time.  I'm also a ."    Attempts to provide pt care such as getting and pushing a peer in a w/c.  Explained liability and that pt care responsibilities are incumbent upon staff so as to avoid potential personal lawsuits if a peer is injured by her actions.  Voiced understanding but stated, "That's fine, but your taking it wrong."  Will continue to observe.      Pt continues to want to discuss her allergy to Haldol as it relates to her mother's death.  She endorses that, "Haldol caused my mother's intestines to burst because she did not take it with Vistaril, which is the same as Benadryl.  If she had, then she wouldn't have had that happen.  And that's what happened to me.  I had that anaphylactic reaction because I didn't take it with the Vistaril."    Tolerating meals/snacks.  Had c/o neck pain.  PRN Tylenol 650mg given po.  Will monitor.   "

## 2022-01-30 NOTE — NURSING
Pt c/o insomnia, but requesting Nicotine patch and or gum.  Advised pt that Nicotine is a stimulant and contributes to insomnia.  Nicotine not given @ this time.

## 2022-01-31 PROCEDURE — 25000003 PHARM REV CODE 250: Performed by: INTERNAL MEDICINE

## 2022-01-31 PROCEDURE — 25000003 PHARM REV CODE 250: Performed by: PSYCHIATRY & NEUROLOGY

## 2022-01-31 PROCEDURE — 90833 PSYTX W PT W E/M 30 MIN: CPT | Mod: AF,HB,, | Performed by: PSYCHIATRY & NEUROLOGY

## 2022-01-31 PROCEDURE — 99233 SBSQ HOSP IP/OBS HIGH 50: CPT | Mod: AF,HB,, | Performed by: PSYCHIATRY & NEUROLOGY

## 2022-01-31 PROCEDURE — 90833 PR PSYCHOTHERAPY W/PATIENT W/E&M, 30 MIN (ADD ON): ICD-10-PCS | Mod: AF,HB,, | Performed by: PSYCHIATRY & NEUROLOGY

## 2022-01-31 PROCEDURE — 25000003 PHARM REV CODE 250: Performed by: STUDENT IN AN ORGANIZED HEALTH CARE EDUCATION/TRAINING PROGRAM

## 2022-01-31 PROCEDURE — 11400000 HC PSYCH PRIVATE ROOM

## 2022-01-31 PROCEDURE — 99233 PR SUBSEQUENT HOSPITAL CARE,LEVL III: ICD-10-PCS | Mod: AF,HB,, | Performed by: PSYCHIATRY & NEUROLOGY

## 2022-01-31 RX ORDER — DIVALPROEX SODIUM 500 MG/1
500 TABLET, FILM COATED, EXTENDED RELEASE ORAL NIGHTLY
Status: DISCONTINUED | OUTPATIENT
Start: 2022-01-31 | End: 2022-02-04 | Stop reason: HOSPADM

## 2022-01-31 RX ADMIN — NICOTINE POLACRILEX 2 MG: 2 GUM, CHEWING BUCCAL at 08:01

## 2022-01-31 RX ADMIN — DIVALPROEX SODIUM 500 MG: 500 TABLET, FILM COATED, EXTENDED RELEASE ORAL at 08:01

## 2022-01-31 RX ADMIN — ACETAMINOPHEN 650 MG: 325 TABLET ORAL at 08:01

## 2022-01-31 RX ADMIN — HYDROXYZINE HYDROCHLORIDE 50 MG: 25 TABLET, FILM COATED ORAL at 01:01

## 2022-01-31 RX ADMIN — ACETAMINOPHEN 650 MG: 325 TABLET ORAL at 02:01

## 2022-01-31 RX ADMIN — TRAZODONE HYDROCHLORIDE 150 MG: 50 TABLET ORAL at 08:01

## 2022-01-31 RX ADMIN — THIAMINE HCL TAB 100 MG 100 MG: 100 TAB at 08:01

## 2022-01-31 RX ADMIN — FOLIC ACID 1 MG: 1 TABLET ORAL at 08:01

## 2022-01-31 RX ADMIN — ARIPIPRAZOLE 15 MG: 10 TABLET ORAL at 08:01

## 2022-01-31 RX ADMIN — HYDROXYZINE HYDROCHLORIDE 50 MG: 25 TABLET, FILM COATED ORAL at 08:01

## 2022-01-31 RX ADMIN — THERA TABS 1 TABLET: TAB at 08:01

## 2022-01-31 RX ADMIN — NICOTINE POLACRILEX 2 MG: 2 GUM, CHEWING BUCCAL at 01:01

## 2022-01-31 NOTE — PLAN OF CARE
POC reviewed and ongoing.    Pt remains delusional and intrusive.  She is med compliant.  Observed speaking with a peer and comparing medications, making suggestions to peer as to what medications the peer may need to be prescribed.    Denies S/HI or A/VH.  Endorses having COVID despite being told that her test from today being negative.  Tolerating meals and snacks without complaint or incident.    Close observation ongoing.

## 2022-01-31 NOTE — PLAN OF CARE
Behavior: Rambling, Manic/disorganized thought process.       Intervention: Psychotherapy focused on coping skills with depressions. Patient viewedSession focused on coping skills when dealing with depression.The main five coping skills addressed were forms of exercising, socializing, responsibilities, hobbies, personal care. Patient's encouraged to start small, make plans towards coping skills, and to include a friend in any commitments chosen.       Response: Patient stated that she would like to get away from [employer] due to causing her so much stress. Patient stated various pastors whom she would look for, for guidance. Patient stated that she would like to start a business with peer in the group, spoke about service dog, moving all of her aunts and grandparents in her group while living in her garage, grandfather leaving her money,.       Plan: SW will continue to encourage patient to attend psychotherapy group.

## 2022-01-31 NOTE — PROGRESS NOTES
"PSYCHIATRY DAILY INPATIENT PROGRESS NOTE  SUBSEQUENT HOSPITAL VISIT    ENCOUNTER DATE: 1/30/2022  SITE: [facility]    DATE OF ADMISSION: 1/25/2022 11:30 PM  LENGTH OF STAY: 5 days    CHIEF COMPLAINT   Umm Green is a 41 y.o. female, seen during daily stone rounds on the inpatient unit.  Umm Green presented with the chief complaint of psychosis    The patient was seen and examined. The chart was reviewed.     Reviewed notes from Rns and MD and labs from the last 24 hours.    The patient's case was discussed with the treatment team/care providers today including Rns    Staff reports no behavioral or management issues.     The patient has been compliant with treatment.    nsg note:    Pt remains delusional, intrusive to peers and staff, hyper Rastafarian, even grandiose @ times.  Namely, stating, "I'm the youngest  in the state.  I had 5 special needs children @ one time.  AND I was taking care of my spiritual  @ the same time.  I'm also a ."     Attempts to provide pt care such as getting and pushing a peer in a w/c.  Explained liability and that pt care responsibilities are incumbent upon staff so as to avoid potential personal lawsuits if a peer is injured by her actions.  Voiced understanding but stated, "That's fine, but your taking it wrong."  Will continue to observe.       Pt continues to want to discuss her allergy to Haldol as it relates to her mother's death.  She endorses that, "Haldol caused my mother's intestines to burst because she did not take it with Vistaril, which is the same as Benadryl.  If she had, then she wouldn't have had that happen.  And that's what happened to me.  I had that anaphylactic reaction because I didn't take it with the Vistaril."     Tolerating meals/snacks.  Had c/o neck pain.  PRN Tylenol 650mg given po.  Will monitor.             Interim events:  Pt was calm and cooperative. She states her mood was "good". "I feel " "optimistic". Staff reports pt doing well on the unit this am, but was delusional, hyperreligious, and grandiose last night.   She is tolerating the increase in abilify. She reports good appetite. States she slept "excellent" last night but staff reports she required prn for insomnia. Denies SI/HI/AVH.    The patient denies any side effects to medications.    Psychiatric ROS (observed, reported, or endorsed/denied):  Depressed mood - denies  Interest/pleasure/anhedonia: denies  Guilt/hopelessness/worthlessness - denies  Changes in Sleep - Continuing  Changes in Appetite - denies  Changes in Concentration - denies  Changes in Energy - denies  PMA/R- denies  Suicidal- active/passive ideations - denies  Homicidal ideations: active/passive ideations - denies    Hallucinations - denies  Delusions - denies but staff reports  Disorganized behavior - denies  Disorganized speech - Continuing  Negative symptoms - None    Elevated mood - less  Decreased need for sleep - less  Grandiosity - recently observed by staff  Racing thoughts - less  Impulsivity - less  Irritability- less  Increased energy - less  Distractibility - Continuing  Increase in goal-directed activity or PMA- less    Symptoms of DEWAYNE - denies  Symptoms of Panic Disorder- denies  Symptoms of PTSD - denies    Overall progress: Patient is showing mild improvement       Medical ROS  Review of Systems   Constitutional: Negative.    HENT: Negative.    Eyes: Negative.    Respiratory: Negative.    Cardiovascular: Negative.    Gastrointestinal: Negative.    Genitourinary: Negative.    Musculoskeletal: Negative.    Skin: Negative.    Neurological: Negative.    Endo/Heme/Allergies: Negative.    Psychiatric/Behavioral:        As noted       PAST MEDICAL HISTORY   Past Medical History:   Diagnosis Date    Hyperlipidemia     Mental disorder     Schizoaffective disorder        PSYCHOTROPIC MEDICATIONS   Scheduled Meds:   ARIPiprazole  15 mg Oral Daily    folic acid  1 mg " "Oral Daily    multivitamin  1 tablet Oral Daily    thiamine  100 mg Oral Daily    traZODone  150 mg Oral QHS     Continuous Infusions:  PRN Meds:.acetaminophen, aluminum-magnesium hydroxide-simethicone, artificial tears ointment, [DISCONTINUED] haloperidoL **AND** diphenhydrAMINE **AND** LORazepam **AND** [DISCONTINUED] haloperidol lactate **AND** diphenhydrAMINE **AND** lorazepam, diphenhydrAMINE, hydrOXYzine HCL, influenza, magnesium hydroxide 400 mg/5 ml, nicotine (polacrilex)    EXAMINATION    VITALS   Vitals:    01/28/22 1912 01/29/22 0718 01/29/22 1923 01/30/22 0746   BP: 111/64 (!) 105/53 134/63 (!) 106/52   BP Location: Left arm Left arm Left arm Right arm   Patient Position: Sitting Sitting Sitting Sitting   Pulse: (!) 55 (!) 53 (!) 53 66   Resp: 20 18 18 18   Temp: 98.4 °F (36.9 °C) 97.1 °F (36.2 °C) 98.7 °F (37.1 °C) 98.9 °F (37.2 °C)   TempSrc: Oral Oral Oral Oral   SpO2: 98% 99% 99% 99%   Weight:       Height:           Body mass index is 20.99 kg/m².    CONSTITUTIONAL  General Appearance: unremarkable, age appropriate    MUSCULOSKELETAL  Muscle Strength and Tone:no tremor, no tic  Abnormal Involuntary Movements: No  Gait and Station: non-ataxic    PSYCHIATRIC   Level of Consciousness: awake and alert   Orientation: person, place and situation  Grooming: Disheveled and Hospital garb  Psychomotor Behavior: normal, cooperative  Speech: normal tone, normal pitch, normal volume, rate somewhat increased  Language: grossly intact  Mood: "Good"  Affect: Consistent with mood and Congruent with thought  Thought Process: tangential  Associations: intact   Thought Content: DENIES suicidal ideation, DENIES homicidal ideation and no delusions  Perceptions: denies hallucinations  Memory: Able to recall past events, Remote intact and Recent intact  Attention:Attends to interview without distraction  Fund of Knowledge: Aware of current events and Vocabulary appropriate   Estimate if Intelligence:  Average based on " work/education history, vocabulary and mental status exam  Insight: limited awareness of illness  Judgment: behavior is adequate to circumstances    DIAGNOSTIC TESTING   Laboratory Results  Recent Results (from the past 24 hour(s))   COVID-19 Rapid Screening    Collection Time: 01/30/22  2:35 PM   Result Value Ref Range    SARS-CoV-2 RNA, Amplification, Qual Negative Negative       MEDICAL DECISION MAKING     ASSESSMENT         Schizoaffective disorder, MRE depressed, acute exacerbation, severe  Insomnia due to another mental disorder  Cannabis abuse  Psychosocial stressors     Hypokalemia     H/o HLD           PROBLEM LIST AND MANAGEMENT PLANS     Schizoaffective disorder, MRE depressed, acute exacerbation, severe  - Continue abilify 15 mg daily first dose yesterday, 1/29/22  - Continue ativan 0.5 mg PO once PRN for anxiety  - pt counseled  - follow up with outpatient mental health provider after discharge     Insomnia due to another mental disorder  - increase trazodone to 150 mg qhs   - Continue  hydroxyzine 50 mg PO q 6 hours PRN  - pt counseled  - follow up with outpatient mental health provider after discharge     Cannabis abuse  - SW consulted for assistance with additional resources   - pt counseled  - follow up with outpatient mental health provider after discharge     Psychosocial stressors  - SW consulted for assistance with additional resources   - pt counseled  - follow up with outpatient mental health provider after discharge     Hypokalemia  - monitor  - FM consulted     H/o HLD  - lipid panel checked, WNL  - FM consulted     Discussed diagnosis, risks and benefits of proposed treatment vs alternative treatments vs no treatment, potential side effects of these treatments and the inherent unpredictability of treatment. The patient expresses understanding of the above and displays the capacity to agree with this treatment given said understanding. Patient also agrees that, currently, the benefits  outweigh the risks and would like to pursue/continue treatment at this time.        DISCHARGE PLANNING  Expected Disposition Plan: Home or Self Care        NEED FOR CONTINUED HOSPITALIZATION  Psychiatric illness continues to pose a potential threat to life or bodily function, of self or others, thereby requiring the need for continued inpatient psychiatric hospitalization: Yes, due to: gravely disabled, as evidenced by:  Ongoing concerns with perceptual aberrancy and paranoid persecutory delusions leading to potential harm of self or others.     Protective inpatient pyschiatric hospitalization required while a safe disposition plan is enacted: Yes     Patient stabilized and ready for discharge from inpatient psychiatric unit: NoSTAFF:   Robb Badillo md  Psychiatry  Time 15min  More than 50% of that time spent on chart review, formulation of healthcare plan, examination and discussion with patient and healthcare team.

## 2022-01-31 NOTE — NURSING
Pt remains hyperverbal and intrusive but   Easily redirectable.  Has been out of her room most of the day.  Eating 100 percent of meals.  Taking meds with no side effects noted.  Pt remains preoccupied with going home.  No outbursts noted. covid test done today as per unit protocol.  Result negative.  Pt received a prn of vistaril 50 mg for c/o anxiety and effective.  No new orders noted today.  Will cont q 15 min close obs as per md orders.
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
## 2022-02-01 PROCEDURE — 25000003 PHARM REV CODE 250: Performed by: INTERNAL MEDICINE

## 2022-02-01 PROCEDURE — 11400000 HC PSYCH PRIVATE ROOM

## 2022-02-01 PROCEDURE — 90833 PSYTX W PT W E/M 30 MIN: CPT | Mod: AF,HB,, | Performed by: PSYCHIATRY & NEUROLOGY

## 2022-02-01 PROCEDURE — 99233 PR SUBSEQUENT HOSPITAL CARE,LEVL III: ICD-10-PCS | Mod: AF,HB,, | Performed by: PSYCHIATRY & NEUROLOGY

## 2022-02-01 PROCEDURE — 25000003 PHARM REV CODE 250: Performed by: PSYCHIATRY & NEUROLOGY

## 2022-02-01 PROCEDURE — 90833 PR PSYCHOTHERAPY W/PATIENT W/E&M, 30 MIN (ADD ON): ICD-10-PCS | Mod: AF,HB,, | Performed by: PSYCHIATRY & NEUROLOGY

## 2022-02-01 PROCEDURE — 99233 SBSQ HOSP IP/OBS HIGH 50: CPT | Mod: AF,HB,, | Performed by: PSYCHIATRY & NEUROLOGY

## 2022-02-01 RX ORDER — TRAZODONE HYDROCHLORIDE 100 MG/1
200 TABLET ORAL NIGHTLY
Status: DISCONTINUED | OUTPATIENT
Start: 2022-02-01 | End: 2022-02-04 | Stop reason: HOSPADM

## 2022-02-01 RX ORDER — ARIPIPRAZOLE 10 MG/1
20 TABLET ORAL DAILY
Status: DISCONTINUED | OUTPATIENT
Start: 2022-02-02 | End: 2022-02-02

## 2022-02-01 RX ADMIN — HYDROXYZINE HYDROCHLORIDE 50 MG: 25 TABLET, FILM COATED ORAL at 08:02

## 2022-02-01 RX ADMIN — TRAZODONE HYDROCHLORIDE 200 MG: 100 TABLET ORAL at 08:02

## 2022-02-01 RX ADMIN — FOLIC ACID 1 MG: 1 TABLET ORAL at 08:02

## 2022-02-01 RX ADMIN — ACETAMINOPHEN 650 MG: 325 TABLET ORAL at 03:02

## 2022-02-01 RX ADMIN — THIAMINE HCL TAB 100 MG 100 MG: 100 TAB at 08:02

## 2022-02-01 RX ADMIN — DIVALPROEX SODIUM 500 MG: 500 TABLET, FILM COATED, EXTENDED RELEASE ORAL at 08:02

## 2022-02-01 RX ADMIN — ACETAMINOPHEN 650 MG: 325 TABLET ORAL at 05:02

## 2022-02-01 RX ADMIN — ARIPIPRAZOLE 17 MG: 2 TABLET ORAL at 08:02

## 2022-02-01 RX ADMIN — THERA TABS 1 TABLET: TAB at 08:02
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
## 2022-02-01 NOTE — PLAN OF CARE
Behavior: Patient has bizzare thoughts, yet attentive, cooperative, and calm. Some thoughts patient stated attributed to group discussion and were clear while some were not.       Intervention: Psychotherapy intervention focused on challenging anxious thoughts. Patients discussed what anxiety is (pros and cons). Cons of anxiety are irrational thoughts. Patient described a common situation that triggers their anxiety. Patients looked at the worst,best,likely outcome of their situation. Patient imagined the worst outcome 1 week,1 month, and 1 year from now. Lastly, patients looked at rational and irrational thoughts.       Response: Patient was able to discuss her anxious thoughts which were geared towards politics. Patient stated that her take away from group was that she can take a step back, don't respond immediately, and come back to situations to properly process her thoughts.       Plan: SW will continue to encourage patient to attend psychotherapy group.

## 2022-02-01 NOTE — PLAN OF CARE
POC reviewed this shift and is on going. Patient is depressed, calm, cooperative, anxious, and showing pressured speech. Denies Suicidal Ideation, Homicidal Ideation, Auditory Hallucinations, Visual Hallucinations, Tactile Hallucinations, Gustatory Hallucinations, and Delusions. Patient told Dr De Guzman that the Staff lets her volunteer on the floor. Pt continues to be medication compliant and staff will continue to monitor pt closely while on the unit.
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
## 2022-02-02 PROCEDURE — 99233 PR SUBSEQUENT HOSPITAL CARE,LEVL III: ICD-10-PCS | Mod: AF,HB,, | Performed by: PSYCHIATRY & NEUROLOGY

## 2022-02-02 PROCEDURE — 90833 PSYTX W PT W E/M 30 MIN: CPT | Mod: AF,HB,, | Performed by: PSYCHIATRY & NEUROLOGY

## 2022-02-02 PROCEDURE — 25000003 PHARM REV CODE 250: Performed by: INTERNAL MEDICINE

## 2022-02-02 PROCEDURE — 25000003 PHARM REV CODE 250: Performed by: PSYCHIATRY & NEUROLOGY

## 2022-02-02 PROCEDURE — 11400000 HC PSYCH PRIVATE ROOM

## 2022-02-02 PROCEDURE — 99233 SBSQ HOSP IP/OBS HIGH 50: CPT | Mod: AF,HB,, | Performed by: PSYCHIATRY & NEUROLOGY

## 2022-02-02 PROCEDURE — 25000003 PHARM REV CODE 250: Performed by: STUDENT IN AN ORGANIZED HEALTH CARE EDUCATION/TRAINING PROGRAM

## 2022-02-02 PROCEDURE — 90833 PR PSYCHOTHERAPY W/PATIENT W/E&M, 30 MIN (ADD ON): ICD-10-PCS | Mod: AF,HB,, | Performed by: PSYCHIATRY & NEUROLOGY

## 2022-02-02 RX ADMIN — ARIPIPRAZOLE 20 MG: 10 TABLET ORAL at 08:02

## 2022-02-02 RX ADMIN — DIPHENHYDRAMINE HCL 50 MG: 50 CAPSULE ORAL at 09:02

## 2022-02-02 RX ADMIN — THERA TABS 1 TABLET: TAB at 08:02

## 2022-02-02 RX ADMIN — ACETAMINOPHEN 650 MG: 325 TABLET ORAL at 05:02

## 2022-02-02 RX ADMIN — NICOTINE POLACRILEX 2 MG: 2 GUM, CHEWING BUCCAL at 08:02

## 2022-02-02 RX ADMIN — DIVALPROEX SODIUM 500 MG: 500 TABLET, FILM COATED, EXTENDED RELEASE ORAL at 09:02

## 2022-02-02 RX ADMIN — THIAMINE HCL TAB 100 MG 100 MG: 100 TAB at 08:02

## 2022-02-02 RX ADMIN — TRAZODONE HYDROCHLORIDE 200 MG: 100 TABLET ORAL at 09:02

## 2022-02-02 RX ADMIN — LORAZEPAM 2 MG: 1 TABLET ORAL at 05:02

## 2022-02-02 RX ADMIN — FOLIC ACID 1 MG: 1 TABLET ORAL at 08:02
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
## 2022-02-02 NOTE — PLAN OF CARE
POC reviewed this shift and is on going.  Pt continues to be intrusive and forgetting the rules when it fits her. Easily redirected and cooperates with staff. Takes meds with no adverse reactions. Interacts with peers. Able to make needs known. Will continue to monitor for changes and safety.

## 2022-02-02 NOTE — NURSING
Patient medicated with tylenol tabs 2 for c/o h/a,@1711pm,@1825,is quiet without any further c/o's,medication was effective.

## 2022-02-03 PROCEDURE — 90833 PSYTX W PT W E/M 30 MIN: CPT | Mod: AF,HB,, | Performed by: PSYCHIATRY & NEUROLOGY

## 2022-02-03 PROCEDURE — 99233 PR SUBSEQUENT HOSPITAL CARE,LEVL III: ICD-10-PCS | Mod: AF,HB,, | Performed by: PSYCHIATRY & NEUROLOGY

## 2022-02-03 PROCEDURE — 25000003 PHARM REV CODE 250: Performed by: PSYCHIATRY & NEUROLOGY

## 2022-02-03 PROCEDURE — 11400000 HC PSYCH PRIVATE ROOM

## 2022-02-03 PROCEDURE — 25000003 PHARM REV CODE 250: Performed by: INTERNAL MEDICINE

## 2022-02-03 PROCEDURE — 90833 PR PSYCHOTHERAPY W/PATIENT W/E&M, 30 MIN (ADD ON): ICD-10-PCS | Mod: AF,HB,, | Performed by: PSYCHIATRY & NEUROLOGY

## 2022-02-03 PROCEDURE — 99233 SBSQ HOSP IP/OBS HIGH 50: CPT | Mod: AF,HB,, | Performed by: PSYCHIATRY & NEUROLOGY

## 2022-02-03 RX ORDER — TRAZODONE HYDROCHLORIDE 100 MG/1
200 TABLET ORAL NIGHTLY
Qty: 60 TABLET | Refills: 11 | Status: SHIPPED | OUTPATIENT
Start: 2022-02-03 | End: 2023-02-03

## 2022-02-03 RX ORDER — ARIPIPRAZOLE 30 MG/1
30 TABLET ORAL DAILY
Qty: 30 TABLET | Refills: 11 | Status: SHIPPED | OUTPATIENT
Start: 2022-02-04 | End: 2023-02-04

## 2022-02-03 RX ORDER — ARIPIPRAZOLE 10 MG/1
30 TABLET ORAL DAILY
Status: DISCONTINUED | OUTPATIENT
Start: 2022-02-04 | End: 2022-02-04 | Stop reason: HOSPADM

## 2022-02-03 RX ORDER — DIVALPROEX SODIUM 500 MG/1
500 TABLET, FILM COATED, EXTENDED RELEASE ORAL NIGHTLY
Qty: 30 TABLET | Refills: 11 | Status: SHIPPED | OUTPATIENT
Start: 2022-02-03 | End: 2023-02-03

## 2022-02-03 RX ADMIN — DIPHENHYDRAMINE HCL 50 MG: 50 CAPSULE ORAL at 08:02

## 2022-02-03 RX ADMIN — DIVALPROEX SODIUM 500 MG: 500 TABLET, FILM COATED, EXTENDED RELEASE ORAL at 08:02

## 2022-02-03 RX ADMIN — TRAZODONE HYDROCHLORIDE 200 MG: 100 TABLET ORAL at 08:02

## 2022-02-03 RX ADMIN — ARIPIPRAZOLE 25 MG: 10 TABLET ORAL at 08:02

## 2022-02-03 RX ADMIN — FOLIC ACID 1 MG: 1 TABLET ORAL at 08:02

## 2022-02-03 RX ADMIN — THERA TABS 1 TABLET: TAB at 08:02

## 2022-02-03 RX ADMIN — THIAMINE HCL TAB 100 MG 100 MG: 100 TAB at 08:02

## 2022-02-03 NOTE — PLAN OF CARE
Pt. Denied any c/o auditory or visual hallucinations. Pt. Also denied any c/o suicidal or homicidal ideations. Pt. States that she's feeling better today. Will cont. To monitor Pt.

## 2022-02-03 NOTE — PLAN OF CARE
"Behavior: Patient able process group topic, before switching to a topic unrelated.      Intervention: Cognitive Behavorial Therapy focused on thoughts, emotions, and behaviors. CBT helped patients see how you think determines how you feel and behave. Irrational and rational thoughts viewed in the process of various situations.       Response: Patient responses were unrealted to topic and peers comments. Patient would state amen constantly in response to peers or "thank you Kendall."      Plan: SW will continue to encourage patient to attend psychotherapy group.   "
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
## 2022-02-03 NOTE — PLAN OF CARE
"POC reviewed and is ongoing.    Pt intrusive, indecisive.  She has c/o insomnia and was given PRN Benadryl 50mg po.  Also requested Nicotine gum.  Advised pt that this is a stimulant and reminded her that she is already c/o insomnia.  She refused gum @ this time.  Tolerating meals and snacks without complaint or incident.  Endorsed that she refused a snack earlier today, stating, "I don't want to become a glutton or anything.  My mother used to get on me about that.  I wonder if they would give me a snack now since I didn't eat one earlier?"  Advised pt to ask RN for snack as writer is passing medications @ this time.    Denies S/HI or A/VH.  Close observation ongoing.  "

## 2022-02-04 VITALS
DIASTOLIC BLOOD PRESSURE: 57 MMHG | HEART RATE: 62 BPM | HEIGHT: 67 IN | SYSTOLIC BLOOD PRESSURE: 104 MMHG | RESPIRATION RATE: 20 BRPM | BODY MASS INDEX: 21.03 KG/M2 | OXYGEN SATURATION: 99 % | TEMPERATURE: 98 F | WEIGHT: 134 LBS

## 2022-02-04 LAB
ALBUMIN SERPL BCP-MCNC: 3.7 G/DL (ref 3.5–5.2)
ALP SERPL-CCNC: 84 U/L (ref 55–135)
ALT SERPL W/O P-5'-P-CCNC: 20 U/L (ref 10–44)
ANION GAP SERPL CALC-SCNC: 4 MMOL/L (ref 8–16)
AST SERPL-CCNC: 15 U/L (ref 10–40)
BASOPHILS # BLD AUTO: 0.06 K/UL (ref 0–0.2)
BASOPHILS NFR BLD: 0.8 % (ref 0–1.9)
BILIRUB SERPL-MCNC: 0.3 MG/DL (ref 0.1–1)
BUN SERPL-MCNC: 13 MG/DL (ref 6–20)
CALCIUM SERPL-MCNC: 9.1 MG/DL (ref 8.7–10.5)
CHLORIDE SERPL-SCNC: 105 MMOL/L (ref 95–110)
CO2 SERPL-SCNC: 28 MMOL/L (ref 23–29)
CREAT SERPL-MCNC: 0.7 MG/DL (ref 0.5–1.4)
DIFFERENTIAL METHOD: ABNORMAL
EOSINOPHIL # BLD AUTO: 0.2 K/UL (ref 0–0.5)
EOSINOPHIL NFR BLD: 2 % (ref 0–8)
ERYTHROCYTE [DISTWIDTH] IN BLOOD BY AUTOMATED COUNT: 13.4 % (ref 11.5–14.5)
EST. GFR  (AFRICAN AMERICAN): >60 ML/MIN/1.73 M^2
EST. GFR  (NON AFRICAN AMERICAN): >60 ML/MIN/1.73 M^2
GLUCOSE SERPL-MCNC: 86 MG/DL (ref 70–110)
HCT VFR BLD AUTO: 37.7 % (ref 37–48.5)
HGB BLD-MCNC: 12.5 G/DL (ref 12–16)
IMM GRANULOCYTES # BLD AUTO: 0.02 K/UL (ref 0–0.04)
IMM GRANULOCYTES NFR BLD AUTO: 0.3 % (ref 0–0.5)
LYMPHOCYTES # BLD AUTO: 3.1 K/UL (ref 1–4.8)
LYMPHOCYTES NFR BLD: 39.3 % (ref 18–48)
MCH RBC QN AUTO: 31.6 PG (ref 27–31)
MCHC RBC AUTO-ENTMCNC: 33.2 G/DL (ref 32–36)
MCV RBC AUTO: 95 FL (ref 82–98)
MONOCYTES # BLD AUTO: 0.8 K/UL (ref 0.3–1)
MONOCYTES NFR BLD: 10 % (ref 4–15)
NEUTROPHILS # BLD AUTO: 3.7 K/UL (ref 1.8–7.7)
NEUTROPHILS NFR BLD: 47.6 % (ref 38–73)
NRBC BLD-RTO: 0 /100 WBC
PLATELET # BLD AUTO: 276 K/UL (ref 150–450)
PMV BLD AUTO: 9.8 FL (ref 9.2–12.9)
POTASSIUM SERPL-SCNC: 3.8 MMOL/L (ref 3.5–5.1)
PROT SERPL-MCNC: 7.2 G/DL (ref 6–8.4)
RBC # BLD AUTO: 3.96 M/UL (ref 4–5.4)
SARS-COV-2 RDRP RESP QL NAA+PROBE: NEGATIVE
SODIUM SERPL-SCNC: 137 MMOL/L (ref 136–145)
VALPROATE SERPL-MCNC: 35 UG/ML (ref 50–100)
WBC # BLD AUTO: 7.83 K/UL (ref 3.9–12.7)

## 2022-02-04 PROCEDURE — 80053 COMPREHEN METABOLIC PANEL: CPT | Performed by: PSYCHIATRY & NEUROLOGY

## 2022-02-04 PROCEDURE — 25000003 PHARM REV CODE 250: Performed by: INTERNAL MEDICINE

## 2022-02-04 PROCEDURE — 99239 HOSP IP/OBS DSCHRG MGMT >30: CPT | Mod: AF,HB,, | Performed by: PSYCHIATRY & NEUROLOGY

## 2022-02-04 PROCEDURE — 85025 COMPLETE CBC W/AUTO DIFF WBC: CPT | Performed by: PSYCHIATRY & NEUROLOGY

## 2022-02-04 PROCEDURE — 80164 ASSAY DIPROPYLACETIC ACD TOT: CPT | Performed by: PSYCHIATRY & NEUROLOGY

## 2022-02-04 PROCEDURE — 99239 PR HOSPITAL DISCHARGE DAY,>30 MIN: ICD-10-PCS | Mod: AF,HB,, | Performed by: PSYCHIATRY & NEUROLOGY

## 2022-02-04 PROCEDURE — 90833 PR PSYCHOTHERAPY W/PATIENT W/E&M, 30 MIN (ADD ON): ICD-10-PCS | Mod: AF,HB,, | Performed by: PSYCHIATRY & NEUROLOGY

## 2022-02-04 PROCEDURE — U0002 COVID-19 LAB TEST NON-CDC: HCPCS | Performed by: STUDENT IN AN ORGANIZED HEALTH CARE EDUCATION/TRAINING PROGRAM

## 2022-02-04 PROCEDURE — 90833 PSYTX W PT W E/M 30 MIN: CPT | Mod: AF,HB,, | Performed by: PSYCHIATRY & NEUROLOGY

## 2022-02-04 PROCEDURE — 36415 COLL VENOUS BLD VENIPUNCTURE: CPT | Performed by: PSYCHIATRY & NEUROLOGY

## 2022-02-04 PROCEDURE — 25000003 PHARM REV CODE 250: Performed by: PSYCHIATRY & NEUROLOGY

## 2022-02-04 RX ADMIN — FOLIC ACID 1 MG: 1 TABLET ORAL at 08:02

## 2022-02-04 RX ADMIN — ARIPIPRAZOLE 30 MG: 10 TABLET ORAL at 08:02

## 2022-02-04 RX ADMIN — THERA TABS 1 TABLET: TAB at 08:02

## 2022-02-04 RX ADMIN — ACETAMINOPHEN 650 MG: 325 TABLET ORAL at 02:02

## 2022-02-04 RX ADMIN — THIAMINE HCL TAB 100 MG 100 MG: 100 TAB at 08:02

## 2022-02-04 NOTE — PLAN OF CARE
Patient has met all criteria to be discharged today. Patient was explained her discharge instructions and verbalize an understanding of all instructions. A Staff member escorted the patient out of the hospital.

## 2022-02-04 NOTE — PLAN OF CARE
"POC reviewed and is ongoing.    Pt likes to take a caregiver role with peers.  Has to be redirected often and encouraged to make needs known to staff.  Denies S/HI or A/VH.  Voices concern about her common law  help with, "drug addicted psychosis."    PRN Benadryl 50mg given po for c/o insomnia.  Socializing with peers and staff.  Very friendly.  Voices that she looks forward to discharge to home.  Will maintain close observation.  "

## 2022-02-04 NOTE — PROGRESS NOTES
PSYCHOTHERAPY ADD-ON +[id number]   16-37 minutes     Time: 16 minutes  Participants: Met with patient     Therapeutic Intervention Type: insight oriented psychotherapy, behavior modifying psychotherapy, supportive psychotherapy, interactive psychotherapy  Why chosen therapy is appropriate versus another modality: relevant to diagnosis, patient responds to this modality, evidence based practice     Target symptoms: mood disorder, psychosis  Primary focus: mood  Psychotherapeutic techniques: supportive and psycho-educational techniques;   -safety planning and wrap up sesssion     Outcome monitoring methods: self-report, observation     Patient's response to intervention:  The patient's response to intervention is accepting.     Progress toward goals:  The patient's progress toward goals is good.    Junior De Guzman MD  Psychiatry

## 2022-02-04 NOTE — PLAN OF CARE
Behavior: Patient calm and cooperative.      Intervention: Patient given packet to work on individually due to safety precautions and possible exposure to covid. Packet entailed Introduction to CBT: Why Thoughts Matter.Patient reviewed introduction of thoughts, automatic thoughts, and irrational thoughts. Patient re-reviewed information regarding the cognitive triangle. Patient viewed a habit plan which explained instructions, examples, and practice. Patient received packet regarding How to Practice Self-Compassion, having a fair attitude towards yourself, accepting yourself for who you are, taking care of yourself, accepting the struggle, and practicing mindful awareness. Patient given mindfulness exercise (leaves on a stream worksheet). Patient given Distress Tolerance Skills worksheet (radical acceptance, self-soothe with senses, and distraction).     Response: Patient given packet to complete.       Plan: SW will continue to encourage patient to attend psychotherapy group.
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
## 2023-01-19 NOTE — ED NOTES
Discussed TB recs with patient and wife at CT changes likely pneumonitis relation. Encouraged to increase Prednisone dosing to 20mg daily x 1 week, then 10mg daily. Will RTC 1 week for reassessment and future POC. If ongoing clinical respiratory stability/asymptomatic, can consider restarting immunotherapy; low threshold to discuss infliximab dosing for pneumonitis. Telepsych set up. States Dr. Oliva will be calling shortly.

## 2024-03-09 ENCOUNTER — HOSPITAL ENCOUNTER (EMERGENCY)
Facility: HOSPITAL | Age: 44
Discharge: HOME | End: 2024-03-09
Attending: EMERGENCY MEDICINE
Payer: MEDICAID

## 2024-03-09 VITALS
HEART RATE: 87 BPM | OXYGEN SATURATION: 96 % | HEIGHT: 67 IN | RESPIRATION RATE: 17 BRPM | SYSTOLIC BLOOD PRESSURE: 119 MMHG | WEIGHT: 141.56 LBS | BODY MASS INDEX: 22.22 KG/M2 | DIASTOLIC BLOOD PRESSURE: 67 MMHG | TEMPERATURE: 99 F

## 2024-03-09 DIAGNOSIS — K62.5 RECTAL BLEEDING: Primary | ICD-10-CM

## 2024-03-09 DIAGNOSIS — K50.918 CROHN'S DISEASE WITH OTHER COMPLICATION, UNSPECIFIED GASTROINTESTINAL TRACT LOCATION: ICD-10-CM

## 2024-03-09 DIAGNOSIS — A09 INFECTIOUS COLITIS: ICD-10-CM

## 2024-03-09 DIAGNOSIS — N82.4 ENTEROVAGINAL FISTULA: ICD-10-CM

## 2024-03-09 LAB
ABO + RH BLD: NORMAL
ALBUMIN SERPL BCP-MCNC: 4.1 G/DL (ref 3.5–5.2)
ALP SERPL-CCNC: 105 U/L (ref 55–135)
ALT SERPL W/O P-5'-P-CCNC: 11 U/L (ref 10–44)
ANION GAP SERPL CALC-SCNC: 12 MMOL/L (ref 8–16)
APTT PPP: 30.1 SEC (ref 21–32)
AST SERPL-CCNC: 19 U/L (ref 10–40)
B-HCG UR QL: NEGATIVE
BASOPHILS # BLD AUTO: 0.06 K/UL (ref 0–0.2)
BASOPHILS NFR BLD: 0.3 % (ref 0–1.9)
BILIRUB SERPL-MCNC: 0.3 MG/DL (ref 0.1–1)
BILIRUB UR QL STRIP: NEGATIVE
BLD GP AB SCN CELLS X3 SERPL QL: NORMAL
BUN SERPL-MCNC: 15 MG/DL (ref 6–20)
CALCIUM SERPL-MCNC: 9.8 MG/DL (ref 8.7–10.5)
CHLORIDE SERPL-SCNC: 106 MMOL/L (ref 95–110)
CLARITY UR: CLEAR
CO2 SERPL-SCNC: 20 MMOL/L (ref 23–29)
COLOR UR: YELLOW
CREAT SERPL-MCNC: 0.9 MG/DL (ref 0.5–1.4)
DIFFERENTIAL METHOD BLD: ABNORMAL
EOSINOPHIL # BLD AUTO: 0.2 K/UL (ref 0–0.5)
EOSINOPHIL NFR BLD: 1 % (ref 0–8)
ERYTHROCYTE [DISTWIDTH] IN BLOOD BY AUTOMATED COUNT: 13.2 % (ref 11.5–14.5)
EST. GFR  (NO RACE VARIABLE): >60 ML/MIN/1.73 M^2
GLUCOSE SERPL-MCNC: 117 MG/DL (ref 70–110)
GLUCOSE UR QL STRIP: NEGATIVE
HCT VFR BLD AUTO: 36.7 % (ref 37–48.5)
HGB BLD-MCNC: 12.5 G/DL (ref 12–16)
HGB UR QL STRIP: ABNORMAL
IMM GRANULOCYTES # BLD AUTO: 0.08 K/UL (ref 0–0.04)
IMM GRANULOCYTES NFR BLD AUTO: 0.4 % (ref 0–0.5)
INR PPP: 0.9 (ref 0.8–1.2)
KETONES UR QL STRIP: NEGATIVE
LACTATE SERPL-SCNC: 0.8 MMOL/L (ref 0.5–2.2)
LEUKOCYTE ESTERASE UR QL STRIP: NEGATIVE
LYMPHOCYTES # BLD AUTO: 4.7 K/UL (ref 1–4.8)
LYMPHOCYTES NFR BLD: 25.1 % (ref 18–48)
MCH RBC QN AUTO: 32.1 PG (ref 27–31)
MCHC RBC AUTO-ENTMCNC: 34.1 G/DL (ref 32–36)
MCV RBC AUTO: 94 FL (ref 82–98)
MONOCYTES # BLD AUTO: 1.3 K/UL (ref 0.3–1)
MONOCYTES NFR BLD: 6.8 % (ref 4–15)
NEUTROPHILS # BLD AUTO: 12.4 K/UL (ref 1.8–7.7)
NEUTROPHILS NFR BLD: 66.4 % (ref 38–73)
NITRITE UR QL STRIP: NEGATIVE
NRBC BLD-RTO: 0 /100 WBC
PH UR STRIP: 6 [PH] (ref 5–8)
PLATELET # BLD AUTO: 283 K/UL (ref 150–450)
PMV BLD AUTO: 9.1 FL (ref 9.2–12.9)
POTASSIUM SERPL-SCNC: 3.7 MMOL/L (ref 3.5–5.1)
PROT SERPL-MCNC: 7.7 G/DL (ref 6–8.4)
PROT UR QL STRIP: NEGATIVE
PROTHROMBIN TIME: 10.6 SEC (ref 9–12.5)
RBC # BLD AUTO: 3.89 M/UL (ref 4–5.4)
SODIUM SERPL-SCNC: 138 MMOL/L (ref 136–145)
SP GR UR STRIP: 1.01 (ref 1–1.03)
SPECIMEN OUTDATE: NORMAL
URN SPEC COLLECT METH UR: ABNORMAL
UROBILINOGEN UR STRIP-ACNC: NEGATIVE EU/DL
WBC # BLD AUTO: 18.73 K/UL (ref 3.9–12.7)

## 2024-03-09 PROCEDURE — 85610 PROTHROMBIN TIME: CPT | Performed by: EMERGENCY MEDICINE

## 2024-03-09 PROCEDURE — 99285 EMERGENCY DEPT VISIT HI MDM: CPT | Mod: 25

## 2024-03-09 PROCEDURE — 80053 COMPREHEN METABOLIC PANEL: CPT | Performed by: EMERGENCY MEDICINE

## 2024-03-09 PROCEDURE — 81025 URINE PREGNANCY TEST: CPT | Performed by: EMERGENCY MEDICINE

## 2024-03-09 PROCEDURE — 81003 URINALYSIS AUTO W/O SCOPE: CPT | Performed by: EMERGENCY MEDICINE

## 2024-03-09 PROCEDURE — 87040 BLOOD CULTURE FOR BACTERIA: CPT | Performed by: EMERGENCY MEDICINE

## 2024-03-09 PROCEDURE — S4991 NICOTINE PATCH NONLEGEND: HCPCS | Performed by: EMERGENCY MEDICINE

## 2024-03-09 PROCEDURE — 86901 BLOOD TYPING SEROLOGIC RH(D): CPT | Performed by: EMERGENCY MEDICINE

## 2024-03-09 PROCEDURE — 85025 COMPLETE CBC W/AUTO DIFF WBC: CPT | Performed by: EMERGENCY MEDICINE

## 2024-03-09 PROCEDURE — 25500020 PHARM REV CODE 255: Performed by: EMERGENCY MEDICINE

## 2024-03-09 PROCEDURE — 25000003 PHARM REV CODE 250: Performed by: EMERGENCY MEDICINE

## 2024-03-09 PROCEDURE — 96365 THER/PROPH/DIAG IV INF INIT: CPT

## 2024-03-09 PROCEDURE — 83605 ASSAY OF LACTIC ACID: CPT | Performed by: EMERGENCY MEDICINE

## 2024-03-09 PROCEDURE — 96367 TX/PROPH/DG ADDL SEQ IV INF: CPT

## 2024-03-09 PROCEDURE — 85730 THROMBOPLASTIN TIME PARTIAL: CPT | Performed by: EMERGENCY MEDICINE

## 2024-03-09 PROCEDURE — 63600175 PHARM REV CODE 636 W HCPCS: Performed by: EMERGENCY MEDICINE

## 2024-03-09 RX ORDER — IBUPROFEN 200 MG
1 TABLET ORAL
Status: DISCONTINUED | OUTPATIENT
Start: 2024-03-09 | End: 2024-03-09 | Stop reason: HOSPADM

## 2024-03-09 RX ORDER — METRONIDAZOLE 500 MG/1
500 TABLET ORAL 3 TIMES DAILY
Qty: 21 TABLET | Refills: 0 | Status: SHIPPED | OUTPATIENT
Start: 2024-03-09 | End: 2024-03-16

## 2024-03-09 RX ORDER — CIPROFLOXACIN 2 MG/ML
400 INJECTION, SOLUTION INTRAVENOUS
Status: DISCONTINUED | OUTPATIENT
Start: 2024-03-09 | End: 2024-03-09 | Stop reason: HOSPADM

## 2024-03-09 RX ORDER — METRONIDAZOLE 500 MG/100ML
500 INJECTION, SOLUTION INTRAVENOUS
Status: DISCONTINUED | OUTPATIENT
Start: 2024-03-09 | End: 2024-03-09 | Stop reason: HOSPADM

## 2024-03-09 RX ORDER — CIPROFLOXACIN 500 MG/1
500 TABLET ORAL 2 TIMES DAILY
Qty: 14 TABLET | Refills: 0 | Status: SHIPPED | OUTPATIENT
Start: 2024-03-09 | End: 2024-03-16

## 2024-03-09 RX ADMIN — IOHEXOL 100 ML: 350 INJECTION, SOLUTION INTRAVENOUS at 02:03

## 2024-03-09 RX ADMIN — METRONIDAZOLE 500 MG: 5 INJECTION, SOLUTION INTRAVENOUS at 04:03

## 2024-03-09 RX ADMIN — CIPROFLOXACIN 400 MG: 2 INJECTION, SOLUTION INTRAVENOUS at 04:03

## 2024-03-09 RX ADMIN — NICOTINE 1 PATCH: 21 PATCH, EXTENDED RELEASE TRANSDERMAL at 02:03

## 2024-03-09 NOTE — ED PROVIDER NOTES
"SCRIBE #1 NOTE: I, Tricia Alvarado, am scribing for, and in the presence of, Guerda Vaqsuez DO. I have scribed the entire note.       History     Chief Complaint   Patient presents with    Rectal Bleeding     Pt reports seeing bright red blood in stool around 3pm today, then 2nd episode of bright red blood noted approx 45 min PTA      Review of patient's allergies indicates:   Allergen Reactions    Haldol [haloperidol lactate] Anaphylaxis     Pt states, "It causes anaphylactic shock."    Latex, natural rubber Anaphylaxis    Sulfa (sulfonamide antibiotics)          History of Present Illness     HPI    3/9/2024, 12:36 AM  History obtained from the patient      History of Present Illness: Umm Green is a 43 y.o. female patient with a PMHx of HLD and Schizoaffective disorder who presents to the Emergency Department for evaluation of bloody stool which onset 1 hour ago. Pt states she was using the restroom when she noticed bright red blood in her stool. Pt states she was constipated. Pt denies drug, alcohol use but reports cigarette use. Associated sxs include constipation and lightheadedness. Patient denies any abdominal pain, rectal pain, N/V, CP, SOB, weakness, dizziness, and all other sxs at this time. No prior Tx reported. No further complaints or concerns at this time.       Arrival mode: Ambulance Service    PCP: Mo David MD        Past Medical History:  Past Medical History:   Diagnosis Date    Hyperlipidemia     Mental disorder     Schizoaffective disorder        Past Surgical History:  Past Surgical History:   Procedure Laterality Date     SECTION      x1    MULTIPLE TOOTH EXTRACTIONS      X5         Family History:  No family history on file.    Social History:  Social History     Tobacco Use    Smoking status: Former     Current packs/day: 0.00     Types: Cigarettes     Quit date: 2014     Years since quittin.8    Smokeless tobacco: Never   Substance and Sexual " Activity    Alcohol use: No    Drug use: No    Sexual activity: Yes     Partners: Male     Birth control/protection: None        Review of Systems     Review of Systems   Constitutional:  Negative for fever.   Respiratory:  Negative for shortness of breath.    Cardiovascular:  Negative for chest pain.   Gastrointestinal:  Positive for blood in stool and constipation. Negative for abdominal pain, nausea, rectal pain and vomiting.   Genitourinary:  Negative for dysuria, vaginal bleeding, vaginal discharge and vaginal pain.   Neurological:  Positive for light-headedness. Negative for dizziness and weakness.        Physical Exam     Initial Vitals [03/09/24 0020]   BP Pulse Resp Temp SpO2   129/78 98 16 99.2 °F (37.3 °C) 98 %      MAP       --          Physical Exam  Nursing Notes and Vital Signs Reviewed.  Constitutional: Patient is in no acute distress. Well-developed and well-nourished.  Head: Atraumatic. Normocephalic.  Eyes: PERRL. EOM intact. Conjunctivae are not pale. No scleral icterus.  ENT: Mucous membranes are moist.   Neck: Supple. Full ROM.   Cardiovascular: Regular rate. Regular rhythm. No murmurs, rubs, or gallops.  Pulmonary/Chest: No respiratory distress. Clear to auscultation bilaterally. No wheezing or rales.  Abdominal: Soft and non-distended.  There is no tenderness.  No rebound, guarding, or rigidity. Good bowel sounds.  Musculoskeletal: Moves all extremities. No obvious deformities. No edema. No calf tenderness.  Skin: Warm and dry.  Neurological:  Alert, awake, and appropriate.  Normal speech.  No acute focal neurological deficits are appreciated.  Psychiatric: Normal affect. Good eye contact. Appropriate in content.  Rectal: No anal fissures. No internal or external hemorrhoids. No gross blood. No melena.  Pelvic: No retained foreign bodies, but presence of what appears to be stool in the posterior vaginal vault.   ED Course   Procedures  ED Vital Signs:  Vitals:    03/09/24 0020 03/09/24 0044  "03/09/24 0230   BP: 129/78  135/85   Pulse: 98  97   Resp: 16  18   Temp: 99.2 °F (37.3 °C)     TempSrc: Oral     SpO2: 98%  96%   Weight:  64.2 kg (141 lb 8.6 oz)    Height: 5' 7" (1.702 m)         Abnormal Lab Results:  Labs Reviewed   CBC W/ AUTO DIFFERENTIAL - Abnormal; Notable for the following components:       Result Value    WBC 18.73 (*)     RBC 3.89 (*)     Hematocrit 36.7 (*)     MCH 32.1 (*)     MPV 9.1 (*)     Gran # (ANC) 12.4 (*)     Immature Grans (Abs) 0.08 (*)     Mono # 1.3 (*)     All other components within normal limits    Narrative:     Release to patient->Immediate   URINALYSIS, REFLEX TO URINE CULTURE - Abnormal; Notable for the following components:    Occult Blood UA Trace (*)     All other components within normal limits    Narrative:     Specimen Source->Urine   COMPREHENSIVE METABOLIC PANEL - Abnormal; Notable for the following components:    CO2 20 (*)     Glucose 117 (*)     All other components within normal limits   CULTURE, BLOOD   CULTURE, BLOOD   APTT    Narrative:     Release to patient->Immediate   PROTIME-INR    Narrative:     Release to patient->Immediate   PREGNANCY TEST, URINE RAPID    Narrative:     Specimen Source->Urine   LACTIC ACID, PLASMA   TYPE & SCREEN        All Lab Results:  Results for orders placed or performed during the hospital encounter of 03/09/24   CBC auto differential   Result Value Ref Range    WBC 18.73 (H) 3.90 - 12.70 K/uL    RBC 3.89 (L) 4.00 - 5.40 M/uL    Hemoglobin 12.5 12.0 - 16.0 g/dL    Hematocrit 36.7 (L) 37.0 - 48.5 %    MCV 94 82 - 98 fL    MCH 32.1 (H) 27.0 - 31.0 pg    MCHC 34.1 32.0 - 36.0 g/dL    RDW 13.2 11.5 - 14.5 %    Platelets 283 150 - 450 K/uL    MPV 9.1 (L) 9.2 - 12.9 fL    Immature Granulocytes 0.4 0.0 - 0.5 %    Gran # (ANC) 12.4 (H) 1.8 - 7.7 K/uL    Immature Grans (Abs) 0.08 (H) 0.00 - 0.04 K/uL    Lymph # 4.7 1.0 - 4.8 K/uL    Mono # 1.3 (H) 0.3 - 1.0 K/uL    Eos # 0.2 0.0 - 0.5 K/uL    Baso # 0.06 0.00 - 0.20 K/uL    nRBC " 0 0 /100 WBC    Gran % 66.4 38.0 - 73.0 %    Lymph % 25.1 18.0 - 48.0 %    Mono % 6.8 4.0 - 15.0 %    Eosinophil % 1.0 0.0 - 8.0 %    Basophil % 0.3 0.0 - 1.9 %    Differential Method Automated    APTT   Result Value Ref Range    aPTT 30.1 21.0 - 32.0 sec   Protime-INR   Result Value Ref Range    Prothrombin Time 10.6 9.0 - 12.5 sec    INR 0.9 0.8 - 1.2   Urinalysis, Reflex to Urine Culture Urine, Clean Catch    Specimen: Urine   Result Value Ref Range    Specimen UA Urine, Clean Catch     Color, UA Yellow Yellow, Straw, Trinity    Appearance, UA Clear Clear    pH, UA 6.0 5.0 - 8.0    Specific Gravity, UA 1.015 1.005 - 1.030    Protein, UA Negative Negative    Glucose, UA Negative Negative    Ketones, UA Negative Negative    Bilirubin (UA) Negative Negative    Occult Blood UA Trace (A) Negative    Nitrite, UA Negative Negative    Urobilinogen, UA Negative <2.0 EU/dL    Leukocytes, UA Negative Negative   Pregnancy, urine rapid (UPT)   Result Value Ref Range    Preg Test, Ur Negative    Comprehensive metabolic panel   Result Value Ref Range    Sodium 138 136 - 145 mmol/L    Potassium 3.7 3.5 - 5.1 mmol/L    Chloride 106 95 - 110 mmol/L    CO2 20 (L) 23 - 29 mmol/L    Glucose 117 (H) 70 - 110 mg/dL    BUN 15 6 - 20 mg/dL    Creatinine 0.9 0.5 - 1.4 mg/dL    Calcium 9.8 8.7 - 10.5 mg/dL    Total Protein 7.7 6.0 - 8.4 g/dL    Albumin 4.1 3.5 - 5.2 g/dL    Total Bilirubin 0.3 0.1 - 1.0 mg/dL    Alkaline Phosphatase 105 55 - 135 U/L    AST 19 10 - 40 U/L    ALT 11 10 - 44 U/L    eGFR >60 >60 mL/min/1.73 m^2    Anion Gap 12 8 - 16 mmol/L   Lactic acid, plasma   Result Value Ref Range    Lactate (Lactic Acid) 0.8 0.5 - 2.2 mmol/L   Type & Screen   Result Value Ref Range    Group & Rh AB POS     Indirect Thompson NEG     Specimen Outdate 03/12/2024 23:59         Imaging Results:  Imaging Results              CTA Acute GI [place], Abdomen and Pelvis (Preliminary result)  Result time 03/09/24 02:53:59      Wet Read by Guerda Vasquez  E., DO (03/09/24 02:53:59, [facility] - Emergency Dept., Emergency Medicine)    Stat rad Isai Otoole MD  1. No evidence of active contrast extravasation.    2. Irregular cecal wall thickening.  Findings could represent an infectious or inflammatory colitis or a mass, recommend correlation with direct visualization.  3. 2.7 x 6.1 x 2.1 cm tubular structure within the vaginal canal that appears similar in appearance to enteric contents, foreign body or enterovaginal fistula are not excluded.                                                The Emergency Provider reviewed the vital signs and test results, which are outlined above.     ED Discussion     3:01 AM: Discussed pt's case with Dr. Martínez (Gastroenterology) who recommends consulting with surgery about pt's fistula.    3:17 AM: Discussed pt's case with Dr. Ordonez (Colon and Rectal Surgery) who states that 1 episode of bleeding without instability or ongoing pain can be worked up as an outpatient. Dr. Ordonez states she can have the pt set up to see her this week and expedite a colonoscopy. Dr. Ordonez also recommends performing a pelvic exam to make sure pt does not have a retained foreign body in her vagina.    3:20 AM: Discussed pt's case with Dr. Martínez (Gastroenterology) who recommends giving Cipro or Flagyl for elevated WBC and concern for infectious colitis.    3:21 AM: Discussed pt's case with Dr. Ordonez (Colon and Rectal Surgery) who states if otherwise negative on her pelvic exam, then pt can be discharged and follow up as an outpatient.      3:37 AM: Discussed findings of stool in the pt's vaginal vault during pelvic exam with Dr. Ordonez (Colon and Rectal Surgery) who states her nurse will call the pt on Monday, and Dr. Ordonez will see her next week.     4:39 AM: Reassessed pt at this time. Discussed with pt all pertinent ED information and results. Discussed pt dx and plan of tx. Gave pt all f/u and return to the ED instructions. All  questions and concerns were addressed at this time. Pt expresses understanding of information and instructions, and is comfortable with plan to discharge. Pt is stable for discharge.    I discussed with patient and/or family/caretaker that evaluation in the ED does not suggest any emergent or life threatening medical conditions requiring immediate intervention beyond what was provided in the ED, and I believe patient is safe for discharge.  Regardless, an unremarkable evaluation in the ED does not preclude the development or presence of a serious of life threatening condition. As such, patient was instructed to return immediately for any worsening or change in current symptoms.     ED Course as of 03/09/24 0443   Sat Mar 09, 2024   0254 43-year-old female presents with 1 episode of rectal bleeding with bowel movement.  Reports constipation and straining at the stool.  Exam negative for anal fissure, hemorrhoid, gross blood, or melena.  H&H normal.  CTA abdomen and pelvis: 1. No evidence of active contrast extravasation.    2. Irregular cecal wall thickening.  Findings could represent an infectious or inflammatory colitis or a mass, recommend correlation with direct visualization.  3. 2.7 x 6.1 x 2.1 cm tubular structure within the vaginal canal that appears similar in appearance to enteric contents, foreign body or enterovaginal fistula are not excluded.     [NF]   0434 Lactic Acid Level: 0.8 [NF]      ED Course User Index  [NF] Guerda Vasquez, DO     Medical Decision Making  43-year-old female presents with 1 episode of painless rectal bleeding with constipation and straining at the stool.  CBC shows normal H&H.  CMP normal.  Bleeding times normal.  Lactic acid normal UA negative.  Pregnancy negative.  CTA abdomen and pelvis negative for active bleeding but does show findings concerning for infectious or inflammatory colitis or mass and enterovaginal fistula.      Amount and/or Complexity of Data Reviewed  Labs:  ordered. Decision-making details documented in ED Course.  Radiology: ordered and independent interpretation performed. Decision-making details documented in ED Course.    Risk  OTC drugs.  Prescription drug management.       Additional MDM:   Differential Diagnosis:   Includes but is not limited to inflammatory bowel disease, ischemic colitis, infectious colitis, diverticular bleed, diverticulitis             ED Medication(s):  Medications   nicotine 21 mg/24 hr 1 patch (1 patch Transdermal Patch Applied 3/9/24 0215)   ciprofloxacin (CIPRO)400mg/200ml D5W IVPB 400 mg (400 mg Intravenous New Bag 3/9/24 0407)   metronidazole IVPB 500 mg (500 mg Intravenous New Bag 3/9/24 0405)   iohexoL (OMNIPAQUE 350) injection 100 mL (100 mLs Intravenous Given 3/9/24 0214)       New Prescriptions    CIPROFLOXACIN HCL (CIPRO) 500 MG TABLET    Take 1 tablet (500 mg total) by mouth 2 (two) times daily. for 7 days    METRONIDAZOLE (FLAGYL) 500 MG TABLET    Take 1 tablet (500 mg total) by mouth 3 (three) times daily. for 7 days        Follow-up Information       Mo David MD On 3/11/2024.    Specialty: Internal Medicine  Contact information:  [facility]  [place] [address]  [phone]               Meghana Ordonez MD.    Specialty: Colon and Rectal Surgery  Why: Follow up in her office next week  Contact information:  [address] [place] [address]  [phone]               [facility] - Emergency Dept..    Specialty: Emergency Medicine  Why: As needed, If symptoms worsen  Contact information:  [address] [facility] [id number] [phone]                               Scribe Attestation:   Scribe #1: I performed the above scribed service and the documentation accurately describes the services I performed. I attest to the accuracy of the note.     Attending:   Physician Attestation Statement for Scribe #1: IPedro Nicole E., , personally performed the services  described in this documentation, as scribed by Tricia Alvarado, in my presence, and it is both accurate and complete.       Scribe Attestation:   Scribe #1: I performed the above scribed service and the documentation accurately describes the services I performed. I attest to the accuracy of the note.         Clinical Impression       ICD-10-CM ICD-9-CM   1. Rectal bleeding  K62.5 569.3   2. Rectovaginal fistula  N82.3 619.1   3. Infectious colitis  A09 009.0   4. Crohn's disease with other complication, unspecified gastrointestinal tract location  K50.918 555.9       Disposition:   Disposition: Discharged  Condition: Stable        Guerda Vasquez,   03/09/24 0448

## 2024-03-14 LAB
BACTERIA BLD CULT: NORMAL
BACTERIA BLD CULT: NORMAL

## 2024-03-20 ENCOUNTER — HOSPITAL ENCOUNTER (EMERGENCY)
Facility: HOSPITAL | Age: 44
Discharge: HOME | End: 2024-03-20
Attending: EMERGENCY MEDICINE
Payer: MEDICAID

## 2024-03-20 VITALS
TEMPERATURE: 98 F | RESPIRATION RATE: 17 BRPM | HEART RATE: 123 BPM | BODY MASS INDEX: 22.7 KG/M2 | WEIGHT: 144.63 LBS | HEIGHT: 67 IN | DIASTOLIC BLOOD PRESSURE: 95 MMHG | OXYGEN SATURATION: 99 % | SYSTOLIC BLOOD PRESSURE: 127 MMHG

## 2024-03-20 DIAGNOSIS — B96.89 BACTERIAL VAGINOSIS: Primary | ICD-10-CM

## 2024-03-20 DIAGNOSIS — N76.0 BACTERIAL VAGINOSIS: Primary | ICD-10-CM

## 2024-03-20 PROCEDURE — 99283 EMERGENCY DEPT VISIT LOW MDM: CPT

## 2024-03-20 RX ORDER — METRONIDAZOLE 500 MG/1
500 TABLET ORAL 4 TIMES DAILY
Qty: 28 TABLET | Refills: 0 | Status: SHIPPED | OUTPATIENT
Start: 2024-03-20 | End: 2024-03-27

## 2024-03-20 NOTE — ED PROVIDER NOTES
"Encounter Date: 3/20/2024       History     Chief Complaint   Patient presents with    Foreign Body in Vagina     Pt. states that she inserted a tampon 3 weeks ago and has not removed it since. Pt. Reports dizziness.     HPI  43-year-old white female with extensive psychiatric history presenting complaining of a tampon being retained in her vagina.  She is unsure if she removed her tampon 3 weeks ago.  She denies any pain.  She notes some mild discharge.  No fevers or chills.    Review of patient's allergies indicates:   Allergen Reactions    Haldol [haloperidol lactate] Anaphylaxis     Pt states, "It causes anaphylactic shock."    Latex, natural rubber Anaphylaxis    Sulfamethoxazole-trimethoprim Anaphylaxis    Sulfa (sulfonamide antibiotics)      Past Medical History:   Diagnosis Date    Hyperlipidemia     Mental disorder     Schizoaffective disorder      Past Surgical History:   Procedure Laterality Date     SECTION      x1    MULTIPLE TOOTH EXTRACTIONS      X5     No family history on file.  Social History     Tobacco Use    Smoking status: Former     Current packs/day: 0.00     Types: Cigarettes     Quit date: 2014     Years since quittin.8    Smokeless tobacco: Never   Substance Use Topics    Alcohol use: No    Drug use: No     Review of Systems   Constitutional:  Negative for chills and fever.   Gastrointestinal:  Negative for abdominal pain, diarrhea, nausea and vomiting.   Genitourinary:  Positive for vaginal discharge. Negative for dysuria, frequency, hematuria, pelvic pain, vaginal bleeding and vaginal pain.   Musculoskeletal:  Negative for arthralgias and myalgias.   All other systems reviewed and are negative.      Physical Exam     Initial Vitals [24 0251]   BP Pulse Resp Temp SpO2   (!) 127/95 (!) 123 17 97.9 °F (36.6 °C) 99 %      MAP       --         Physical Exam  Nursing Notes and Vital Signs Reviewed.  Constitutional: Patient is in no acute distress. Well-developed and " well-nourished.  Head: Atraumatic. Normocephalic.  Eyes:  EOM intact.  No scleral icterus.  ENT: Mucous membranes are moist.  Nares clear   Neck:  Full ROM. No JVD.  Abdominal: Soft and non-distended.  There is no tenderness.  No rebound, guarding, or rigidity. Good bowel sounds.  Genitourinary: No CVA tenderness.  No suprapubic tenderness.  Pelvic exam performed with Jadyn as standby.  The cervix and vagina were fully visualized.  I visualized all crevices behind the cervix and have used a cotton-tipped applicator to probe and ensure that everything was fully visualized.  There is no retained foreign body.  There is scant physiologic discharge.  Musculoskeletal: Moves all extremities. No obvious deformities.  5 x 5 strength in all extremities   Skin: Warm and dry.  Neurological:  Alert, awake, and appropriate.  Normal speech.  No acute focal neurological deficits are appreciated.  Two through 12 intact bilaterally.  Psychiatric: Normal affect. Good eye contact. Appropriate in content.    ED Course   Procedures  Labs Reviewed - No data to display       Imaging Results    None          Medications - No data to display  Medical Decision Making  Differential diagnosis:  BV, vaginal discharge, retained tampon,.  Feared Condition ruled out    Patient was extensively evaluated under direct visualization without a foreign body noted.  We have fully visualize the vaginal canal and behind the cervix completely.  There is no foreign body.  Patient was stable safe for discharge.  Will start Flagyl due to her complaining of mild discharge.  She has not sexually active    Amount and/or Complexity of Data Reviewed  External Data Reviewed: notes.    Risk  OTC drugs.  Prescription drug management.                                      Clinical Impression:  Final diagnoses:  [N76.0, B96.89] Bacterial vaginosis (Primary)          ED Disposition Condition    Discharge Stable          ED Prescriptions       Medication Sig Dispense Start  Date End Date Auth. Provider    metroNIDAZOLE (FLAGYL) 500 MG tablet Take 1 tablet (500 mg total) by mouth 4 (four) times daily. for 7 days 28 tablet 3/20/2024 3/27/2024 Jesse Henriquez Jr., MD          Follow-up Information       Follow up With Specialties Details Why Contact Info    Mo David MD Internal Medicine In 1 day  NEED[facility] NEW LOCATION  [facility] [address]  [phone]               Jesse Henriquez Jr., MD  03/20/24 031

## 2024-03-20 NOTE — DISCHARGE INSTRUCTIONS
There is no evidence of retained tampon.  Take Flagyl as prescribed.  Do not drink alcohol while taking Flagyl as this will make you very ill.  Return as needed

## 2024-04-09 ENCOUNTER — HOSPITAL ENCOUNTER (EMERGENCY)
Facility: HOSPITAL | Age: 44
Discharge: HOME | End: 2024-04-09
Attending: EMERGENCY MEDICINE

## 2024-04-09 VITALS
TEMPERATURE: 98 F | OXYGEN SATURATION: 100 % | SYSTOLIC BLOOD PRESSURE: 131 MMHG | BODY MASS INDEX: 23.29 KG/M2 | RESPIRATION RATE: 18 BRPM | HEIGHT: 67 IN | DIASTOLIC BLOOD PRESSURE: 73 MMHG | HEART RATE: 108 BPM | WEIGHT: 148.38 LBS

## 2024-04-09 DIAGNOSIS — R30.0 DYSURIA: Primary | ICD-10-CM

## 2024-04-09 PROCEDURE — 99283 EMERGENCY DEPT VISIT LOW MDM: CPT

## 2024-04-09 RX ORDER — DOXYCYCLINE 100 MG/1
100 CAPSULE ORAL 2 TIMES DAILY
Qty: 14 CAPSULE | Refills: 0 | Status: SHIPPED | OUTPATIENT
Start: 2024-04-09 | End: 2024-04-16

## 2024-04-09 NOTE — ED PROVIDER NOTES
"Encounter Date: 2024       History     Chief Complaint   Patient presents with    follow up     Pt states she was treated for an infection a few weeks ago and needs another antibiotic, pt has no complaints     43-year-old female with complaint of burning with urination and request for antibiotics.  Patient reports she thinks she has an infection and needs an antibiotic.  Patient does not want to give urinalysis.  No fever.  No chills.  No other complaints.        Review of patient's allergies indicates:   Allergen Reactions    Haldol [haloperidol lactate] Anaphylaxis     Pt states, "It causes anaphylactic shock."    Latex, natural rubber Anaphylaxis    Sulfamethoxazole-trimethoprim Anaphylaxis    Sulfa (sulfonamide antibiotics)      Past Medical History:   Diagnosis Date    Hyperlipidemia     Mental disorder     Schizoaffective disorder      Past Surgical History:   Procedure Laterality Date     SECTION      x1    MULTIPLE TOOTH EXTRACTIONS      X5     No family history on file.  Social History     Tobacco Use    Smoking status: Former     Current packs/day: 0.00     Types: Cigarettes     Quit date: 2014     Years since quittin.9    Smokeless tobacco: Never   Substance Use Topics    Alcohol use: No    Drug use: No     Review of Systems   Constitutional:  Negative for fever.   HENT:  Negative for sore throat.    Respiratory:  Negative for shortness of breath.    Cardiovascular:  Negative for chest pain.   Gastrointestinal:  Negative for nausea.   Genitourinary:  Negative for dysuria.   Musculoskeletal:  Negative for back pain.   Skin:  Negative for rash.   Neurological:  Negative for weakness.   Hematological:  Does not bruise/bleed easily.       Physical Exam     Initial Vitals [24 0939]   BP Pulse Resp Temp SpO2   131/73 108 18 98.1 °F (36.7 °C) 100 %      MAP       --         Physical Exam    Nursing note and vitals reviewed.  Constitutional: She appears well-developed and well-nourished. "   HENT:   Head: Normocephalic and atraumatic.   Eyes: Conjunctivae and EOM are normal. Pupils are equal, round, and reactive to light.   Neck: Neck supple.   Normal range of motion.  Cardiovascular:  Normal rate, regular rhythm, normal heart sounds and intact distal pulses.           Pulmonary/Chest: Breath sounds normal.   Abdominal: Abdomen is soft. There is no abdominal tenderness. There is no rebound and no guarding.   Musculoskeletal:         General: Normal range of motion.      Cervical back: Normal range of motion and neck supple.     Neurological: She is alert and oriented to person, place, and time. She has normal strength and normal reflexes.   Skin: Skin is warm and dry.   Psychiatric: She has a normal mood and affect. Her behavior is normal. Thought content normal.         ED Course   Procedures  Labs Reviewed - No data to display       Imaging Results    None          Medications - No data to display  Medical Decision Making                                    Clinical Impression:  Final diagnoses:  [R30.0] Dysuria (Primary)          ED Disposition Condition    Discharge Stable          ED Prescriptions       Medication Sig Dispense Start Date End Date Auth. Provider    doxycycline (VIBRAMYCIN) 100 MG Cap Take 1 capsule (100 mg total) by mouth 2 (two) times daily. for 7 days 14 capsule 4/9/2024 4/16/2024 Willian Clifford NP          Follow-up Information       Follow up With Specialties Details Why Contact Info    Mo David MD Internal Medicine Schedule an appointment as soon as possible for a visit  As needed NEEDS NEW LOCATION  [place] [address]  [phone]               Willian Clifford NP  04/09/24 1022

## 2024-04-15 ENCOUNTER — HOSPITAL ENCOUNTER (EMERGENCY)
Facility: HOSPITAL | Age: 44
Discharge: HOME | End: 2024-04-15
Attending: EMERGENCY MEDICINE

## 2024-04-15 VITALS
SYSTOLIC BLOOD PRESSURE: 116 MMHG | HEART RATE: 87 BPM | DIASTOLIC BLOOD PRESSURE: 61 MMHG | BODY MASS INDEX: 24.38 KG/M2 | RESPIRATION RATE: 18 BRPM | OXYGEN SATURATION: 98 % | TEMPERATURE: 97 F | WEIGHT: 155.63 LBS

## 2024-04-15 DIAGNOSIS — R50.9 FEVER: ICD-10-CM

## 2024-04-15 DIAGNOSIS — J20.9 ACUTE BRONCHITIS, UNSPECIFIED ORGANISM: Primary | ICD-10-CM

## 2024-04-15 LAB
INFLUENZA A, MOLECULAR: NEGATIVE
INFLUENZA B, MOLECULAR: NEGATIVE
SARS-COV-2 RDRP RESP QL NAA+PROBE: NEGATIVE
SPECIMEN SOURCE: NORMAL

## 2024-04-15 PROCEDURE — 87502 INFLUENZA DNA AMP PROBE: CPT | Performed by: NURSE PRACTITIONER

## 2024-04-15 PROCEDURE — 99283 EMERGENCY DEPT VISIT LOW MDM: CPT | Mod: 25

## 2024-04-15 PROCEDURE — U0002 COVID-19 LAB TEST NON-CDC: HCPCS | Performed by: NURSE PRACTITIONER

## 2024-04-15 RX ORDER — AZITHROMYCIN 250 MG/1
250 TABLET, FILM COATED ORAL DAILY
Qty: 6 TABLET | Refills: 0 | Status: SHIPPED | OUTPATIENT
Start: 2024-04-15

## 2024-04-15 NOTE — ED PROVIDER NOTES
"Encounter Date: 4/15/2024       History     Chief Complaint   Patient presents with    General Illness    Weakness    Chills    Headache     Generalized weakness, fever, chills, cough, congestion x one week.     43-year-old female with complaint of cough and congestion and chills for the past 4-5 days.  Denies recorded fever.  Denies shortness of breath.  Denies any other complaints.        Review of patient's allergies indicates:   Allergen Reactions    Haldol [haloperidol lactate] Anaphylaxis     Pt states, "It causes anaphylactic shock."    Latex, natural rubber Anaphylaxis    Sulfamethoxazole-trimethoprim Anaphylaxis    Sulfa (sulfonamide antibiotics)      Past Medical History:   Diagnosis Date    Hyperlipidemia     Mental disorder     Schizoaffective disorder      Past Surgical History:   Procedure Laterality Date     SECTION      x1    MULTIPLE TOOTH EXTRACTIONS      X5     No family history on file.  Social History     Tobacco Use    Smoking status: Former     Current packs/day: 0.00     Types: Cigarettes     Quit date: 2014     Years since quittin.9    Smokeless tobacco: Never   Substance Use Topics    Alcohol use: No    Drug use: No     Review of Systems   Constitutional:  Positive for fever.   HENT:  Positive for congestion. Negative for sore throat.    Respiratory:  Positive for cough. Negative for shortness of breath.    Cardiovascular:  Negative for chest pain.   Gastrointestinal:  Negative for nausea.   Genitourinary:  Negative for dysuria.   Musculoskeletal:  Negative for back pain.   Skin:  Negative for rash.   Neurological:  Negative for weakness.   Hematological:  Does not bruise/bleed easily.       Physical Exam     Initial Vitals [04/15/24 1239]   BP Pulse Resp Temp SpO2   116/61 87 18 97.3 °F (36.3 °C) 98 %      MAP       --         Physical Exam    Nursing note and vitals reviewed.  Constitutional: She appears well-developed and well-nourished.   HENT:   Head: Normocephalic and " atraumatic.   + nasal congestion    Eyes: Conjunctivae and EOM are normal. Pupils are equal, round, and reactive to light.   Neck: Neck supple.   Normal range of motion.  Cardiovascular:  Normal rate, regular rhythm, normal heart sounds and intact distal pulses.           Pulmonary/Chest: Breath sounds normal.   Abdominal: Abdomen is soft. There is no abdominal tenderness. There is no rebound and no guarding.   Musculoskeletal:         General: Normal range of motion.      Cervical back: Normal range of motion and neck supple.     Neurological: She is alert and oriented to person, place, and time. She has normal strength and normal reflexes.   Skin: Skin is warm and dry.   Psychiatric: She has a normal mood and affect. Her behavior is normal. Thought content normal.       Labs Reviewed   INFLUENZA A & B BY MOLECULAR   SARS-COV-2 RNA AMPLIFICATION, QUAL       ED Course   Procedures  Labs Reviewed   INFLUENZA A & B BY MOLECULAR   SARS-COV-2 RNA AMPLIFICATION, QUAL          Imaging Results              X-Ray Chest 1 View (Final result)  Result time 04/15/24 13:04:28      Final result by Yoni Valenzuela MD (04/15/24 13:04:28)                   Impression:      No acute process seen.      Electronically signed by: Yoni Valenzuela MD  Date:    04/15/2024  Time:    13:04               Narrative:    EXAMINATION:  XR CHEST 1 VIEW    CLINICAL HISTORY:  Fever, unspecified    FINDINGS:  Single view of the chest.  Comparison 01/04/2010    Cardiac silhouette is normal.  The lungs demonstrate no evidence of active disease.  Emphysematous changes upper lobes.  No evidence of pleural effusion or pneumothorax.  Bones appear intact.  Moderate degenerative changes and moderate atherosclerotic disease.                                       Medications - No data to display  Medical Decision Making  Amount and/or Complexity of Data Reviewed  Radiology: ordered.    Risk  Prescription drug management.                                       Clinical Impression:  Final diagnoses:  [R50.9] Fever  [J20.9] Acute bronchitis, unspecified organism (Primary)          ED Disposition Condition    Discharge Stable          ED Prescriptions       Medication Sig Dispense Start Date End Date Auth. Provider    azithromycin (Z-ALICIA) 250 MG tablet Take 1 tablet (250 mg total) by mouth once daily. Take first 2 tablets together, then 1 every day until finished. 6 tablet 4/15/2024 -- Willian Clifford NP          Follow-up Information       Follow up With Specialties Details Why Contact Info    Mo David MD Internal Medicine Schedule an appointment as soon as possible for a visit  As needed NEEDS NEW LOCATION  [place] [id number]  [phone]               Willian Clifford NP  04/15/24 0340

## 2024-04-20 ENCOUNTER — HOSPITAL ENCOUNTER (EMERGENCY)
Facility: HOSPITAL | Age: 44
Discharge: HOME | End: 2024-04-20
Attending: FAMILY MEDICINE

## 2024-04-20 VITALS
SYSTOLIC BLOOD PRESSURE: 187 MMHG | OXYGEN SATURATION: 99 % | DIASTOLIC BLOOD PRESSURE: 79 MMHG | HEART RATE: 95 BPM | TEMPERATURE: 98 F | WEIGHT: 147.5 LBS | BODY MASS INDEX: 23.1 KG/M2 | RESPIRATION RATE: 16 BRPM

## 2024-04-20 DIAGNOSIS — R50.9 SUBJECTIVE FEVER: Primary | ICD-10-CM

## 2024-04-20 DIAGNOSIS — F41.9 ANXIETY: ICD-10-CM

## 2024-04-20 DIAGNOSIS — A41.9 SEPSIS: ICD-10-CM

## 2024-04-20 LAB
ALBUMIN SERPL BCP-MCNC: 4.2 G/DL (ref 3.5–5.2)
ALP SERPL-CCNC: 106 U/L (ref 55–135)
ALT SERPL W/O P-5'-P-CCNC: 8 U/L (ref 10–44)
ANION GAP SERPL CALC-SCNC: 12 MMOL/L (ref 8–16)
AST SERPL-CCNC: 15 U/L (ref 10–40)
BASOPHILS # BLD AUTO: 0.08 K/UL (ref 0–0.2)
BASOPHILS NFR BLD: 0.4 % (ref 0–1.9)
BILIRUB SERPL-MCNC: 0.3 MG/DL (ref 0.1–1)
BILIRUB UR QL STRIP: NEGATIVE
BUN SERPL-MCNC: 9 MG/DL (ref 6–20)
CALCIUM SERPL-MCNC: 9.8 MG/DL (ref 8.7–10.5)
CHLORIDE SERPL-SCNC: 105 MMOL/L (ref 95–110)
CLARITY UR: CLEAR
CO2 SERPL-SCNC: 20 MMOL/L (ref 23–29)
COLOR UR: COLORLESS
CREAT SERPL-MCNC: 0.8 MG/DL (ref 0.5–1.4)
DIFFERENTIAL METHOD BLD: ABNORMAL
EOSINOPHIL # BLD AUTO: 0.2 K/UL (ref 0–0.5)
EOSINOPHIL NFR BLD: 1.3 % (ref 0–8)
ERYTHROCYTE [DISTWIDTH] IN BLOOD BY AUTOMATED COUNT: 12.6 % (ref 11.5–14.5)
EST. GFR  (NO RACE VARIABLE): >60 ML/MIN/1.73 M^2
GLUCOSE SERPL-MCNC: 101 MG/DL (ref 70–110)
GLUCOSE UR QL STRIP: NEGATIVE
HCT VFR BLD AUTO: 38.2 % (ref 37–48.5)
HGB BLD-MCNC: 13.3 G/DL (ref 12–16)
HGB UR QL STRIP: NEGATIVE
IMM GRANULOCYTES # BLD AUTO: 0.08 K/UL (ref 0–0.04)
IMM GRANULOCYTES NFR BLD AUTO: 0.4 % (ref 0–0.5)
KETONES UR QL STRIP: NEGATIVE
LACTATE SERPL-SCNC: 1 MMOL/L (ref 0.5–2.2)
LEUKOCYTE ESTERASE UR QL STRIP: NEGATIVE
LYMPHOCYTES # BLD AUTO: 4 K/UL (ref 1–4.8)
LYMPHOCYTES NFR BLD: 21.1 % (ref 18–48)
MCH RBC QN AUTO: 32.4 PG (ref 27–31)
MCHC RBC AUTO-ENTMCNC: 34.8 G/DL (ref 32–36)
MCV RBC AUTO: 93 FL (ref 82–98)
MONOCYTES # BLD AUTO: 1.2 K/UL (ref 0.3–1)
MONOCYTES NFR BLD: 6.5 % (ref 4–15)
NEUTROPHILS # BLD AUTO: 13.4 K/UL (ref 1.8–7.7)
NEUTROPHILS NFR BLD: 70.3 % (ref 38–73)
NITRITE UR QL STRIP: NEGATIVE
NRBC BLD-RTO: 0 /100 WBC
PH UR STRIP: 6 [PH] (ref 5–8)
PLATELET # BLD AUTO: 301 K/UL (ref 150–450)
PMV BLD AUTO: 9.2 FL (ref 9.2–12.9)
POTASSIUM SERPL-SCNC: 3.6 MMOL/L (ref 3.5–5.1)
PROT SERPL-MCNC: 7.7 G/DL (ref 6–8.4)
PROT UR QL STRIP: NEGATIVE
RBC # BLD AUTO: 4.1 M/UL (ref 4–5.4)
SODIUM SERPL-SCNC: 137 MMOL/L (ref 136–145)
SP GR UR STRIP: 1.01 (ref 1–1.03)
URN SPEC COLLECT METH UR: ABNORMAL
UROBILINOGEN UR STRIP-ACNC: NEGATIVE EU/DL
WBC # BLD AUTO: 19.03 K/UL (ref 3.9–12.7)

## 2024-04-20 PROCEDURE — 81003 URINALYSIS AUTO W/O SCOPE: CPT | Performed by: NURSE PRACTITIONER

## 2024-04-20 PROCEDURE — 85025 COMPLETE CBC W/AUTO DIFF WBC: CPT | Performed by: NURSE PRACTITIONER

## 2024-04-20 PROCEDURE — 93005 ELECTROCARDIOGRAM TRACING: CPT

## 2024-04-20 PROCEDURE — 87040 BLOOD CULTURE FOR BACTERIA: CPT | Mod: 59 | Performed by: NURSE PRACTITIONER

## 2024-04-20 PROCEDURE — 99285 EMERGENCY DEPT VISIT HI MDM: CPT | Mod: 25

## 2024-04-20 PROCEDURE — 93010 ELECTROCARDIOGRAM REPORT: CPT | Mod: ,,, | Performed by: INTERNAL MEDICINE

## 2024-04-20 PROCEDURE — 83605 ASSAY OF LACTIC ACID: CPT | Performed by: NURSE PRACTITIONER

## 2024-04-20 PROCEDURE — 80053 COMPREHEN METABOLIC PANEL: CPT | Performed by: NURSE PRACTITIONER

## 2024-04-20 NOTE — FIRST PROVIDER EVALUATION
Medical screening examination initiated.  I have conducted a focused provider triage encounter, findings are as follows:    Brief history of present illness:  Patient presents the ER for fever, fatigue, body aches, dizziness.  Patient reports she is currently on antibiotics due to leaving a tampon in.  Patient reports her symptoms have been worsening    Vitals:    04/20/24 1513   BP: 135/67   BP Location: Right arm   Patient Position: Sitting   Pulse: 107   Resp: 16   Temp: 98.2 °F (36.8 °C)   TempSrc: Oral   SpO2: 96%   Weight: 66.9 kg (147 lb 7.8 oz)       Pertinent physical exam:  No acute distress    Brief workup plan:  Septic workup    Preliminary workup initiated; this workup will be continued and followed by the physician or advanced practice provider that is assigned to the patient when roomed.

## 2024-04-20 NOTE — ED PROVIDER NOTES
"SCRIBE #1 NOTE: I, Me-Lowell Duncan, am scribing for, and in the presence of, Soraya Mancilla MD. I have scribed the entire note.       History     Chief Complaint   Patient presents with    Fever     Subjective fever, dizziness, lightheadedness, body aches. States she is being treated with antibiotics for infection related to leaving a tampon in vagina x 3 weeks. Was seen at [facility] ER and treated. Denies vaginal discharge.  Foul odor with urination.     Review of patient's allergies indicates:   Allergen Reactions    Haldol [haloperidol lactate] Anaphylaxis     Pt states, "It causes anaphylactic shock."    Latex, natural rubber Anaphylaxis    Sulfamethoxazole-trimethoprim Anaphylaxis    Sulfa (sulfonamide antibiotics)          History of Present Illness     HPI    4/20/2024, 4:04 PM  History obtained from the patient      History of Present Illness: Umm Green is a 43 y.o. female patient with a PMHx of hyperlipidemia, mental disorder, and schizoaffective disorder who presents to the Emergency Department for evaluation of subjective fever which onset today. Pt states that she is being treated for infection due to leaving a tampon in vagina for 3 weeks; pt states that she was placed on 5 different antibiotics. Pt was seen at [facility] ER. At this time, pt only complains of malodorous urine. Symptoms are constant and moderate in severity. No mitigating or exacerbating factors reported. Patient denies any vaginal discharge, nausea, vomiting, abdominal pain, dysuria, anxiety, and all other sxs at this time. Pt took azithromycin with no improvement to symptoms. Pt states that she is allergic to sulfa antibiotics. No further complaints or concerns at this time.       Arrival mode: Personal vehicle    PCP: Mo David MD        Past Medical History:  Past Medical History:   Diagnosis Date    Hyperlipidemia     Mental disorder     Schizoaffective disorder        Past Surgical History:  Past Surgical History: "   Procedure Laterality Date     SECTION      x1    MULTIPLE TOOTH EXTRACTIONS      X5         Family History:  No family history on file.    Social History:  Social History     Tobacco Use    Smoking status: Former     Current packs/day: 0.00     Types: Cigarettes     Quit date: 2014     Years since quittin.9    Smokeless tobacco: Never   Substance and Sexual Activity    Alcohol use: No    Drug use: No    Sexual activity: Yes     Partners: Male     Birth control/protection: None        Review of Systems     Review of Systems   Constitutional:  Positive for fever (subjective).   HENT:  Negative for sore throat.    Respiratory:  Negative for shortness of breath.    Cardiovascular:  Negative for chest pain.   Gastrointestinal:  Negative for abdominal pain, nausea and vomiting.   Genitourinary:  Negative for dysuria and vaginal discharge.        (+) malodorous urine   Musculoskeletal:  Negative for back pain.   Neurological:  Negative for weakness.   Psychiatric/Behavioral:  The patient is not nervous/anxious.    All other systems reviewed and are negative.       Physical Exam     Initial Vitals [24 1513]   BP Pulse Resp Temp SpO2   135/67 107 16 98.2 °F (36.8 °C) 96 %      MAP       --          Physical Exam  Nursing Notes and Vital Signs Reviewed.  Constitutional: Patient is in no acute distress. Well-developed and well-nourished.  Head: Atraumatic. Normocephalic.  Eyes: PERRL. EOM intact. Conjunctivae are not pale. No scleral icterus.  ENT: Mucous membranes are moist. Oropharynx is clear and symmetric.    Neck: Supple. Full ROM. No lymphadenopathy.  Cardiovascular: Regular rate. Regular rhythm. No murmurs, rubs, or gallops.   Pulmonary/Chest: No respiratory distress. Clear to auscultation bilaterally. No wheezing or rales.  Abdominal: Soft and non-distended.  There is no tenderness.  No rebound, guarding, or rigidity.   Genitourinary: No CVA tenderness  Musculoskeletal: Moves all extremities. No  "obvious deformities. No edema.  Skin: Warm and dry.  Neurological:  Alert, awake, and appropriate.  Normal speech. No acute focal neurological deficits are appreciated.  Psychiatric: Good eye contact. Pt has an irrational fear of toxic shock syndrome.  Pt has an unrealistic fear of death, saying " I am going to die".      ED Course   Procedures  ED Vital Signs:  Vitals:    04/20/24 1513 04/20/24 1608 04/20/24 1640   BP: 135/67 (!) 172/78 (!) 187/79   Pulse: 107 95 95   Resp: 16     Temp: 98.2 °F (36.8 °C)     TempSrc: Oral     SpO2: 96% 98% 99%   Weight: 66.9 kg (147 lb 7.8 oz)         Abnormal Lab Results:  Labs Reviewed   CBC W/ AUTO DIFFERENTIAL - Abnormal; Notable for the following components:       Result Value    WBC 19.03 (*)     MCH 32.4 (*)     Gran # (ANC) 13.4 (*)     Immature Grans (Abs) 0.08 (*)     Mono # 1.2 (*)     All other components within normal limits   COMPREHENSIVE METABOLIC PANEL - Abnormal; Notable for the following components:    CO2 20 (*)     ALT 8 (*)     All other components within normal limits   URINALYSIS, REFLEX TO URINE CULTURE - Abnormal; Notable for the following components:    Color, UA Colorless (*)     All other components within normal limits    Narrative:     Specimen Source->Urine   CULTURE, BLOOD    Narrative:     Aerobic and anaerobic   CULTURE, BLOOD    Narrative:     Aerobic and anaerobic   LACTIC ACID, PLASMA        All Lab Results:  Results for orders placed or performed during the hospital encounter of 04/20/24   Blood culture x two cultures. Draw prior to antibiotics.    Specimen: Peripheral, Hand, Left; Blood   Result Value Ref Range    Blood Culture, Routine No Growth to date    Blood culture x two cultures. Draw prior to antibiotics.    Specimen: Peripheral, Forearm, Right; Blood   Result Value Ref Range    Blood Culture, Routine No Growth to date    CBC auto differential   Result Value Ref Range    WBC 19.03 (H) 3.90 - 12.70 K/uL    RBC 4.10 4.00 - 5.40 M/uL    " Hemoglobin 13.3 12.0 - 16.0 g/dL    Hematocrit 38.2 37.0 - 48.5 %    MCV 93 82 - 98 fL    MCH 32.4 (H) 27.0 - 31.0 pg    MCHC 34.8 32.0 - 36.0 g/dL    RDW 12.6 11.5 - 14.5 %    Platelets 301 150 - 450 K/uL    MPV 9.2 9.2 - 12.9 fL    Immature Granulocytes 0.4 0.0 - 0.5 %    Gran # (ANC) 13.4 (H) 1.8 - 7.7 K/uL    Immature Grans (Abs) 0.08 (H) 0.00 - 0.04 K/uL    Lymph # 4.0 1.0 - 4.8 K/uL    Mono # 1.2 (H) 0.3 - 1.0 K/uL    Eos # 0.2 0.0 - 0.5 K/uL    Baso # 0.08 0.00 - 0.20 K/uL    nRBC 0 0 /100 WBC    Gran % 70.3 38.0 - 73.0 %    Lymph % 21.1 18.0 - 48.0 %    Mono % 6.5 4.0 - 15.0 %    Eosinophil % 1.3 0.0 - 8.0 %    Basophil % 0.4 0.0 - 1.9 %    Differential Method Automated    Comprehensive metabolic panel   Result Value Ref Range    Sodium 137 136 - 145 mmol/L    Potassium 3.6 3.5 - 5.1 mmol/L    Chloride 105 95 - 110 mmol/L    CO2 20 (L) 23 - 29 mmol/L    Glucose 101 70 - 110 mg/dL    BUN 9 6 - 20 mg/dL    Creatinine 0.8 0.5 - 1.4 mg/dL    Calcium 9.8 8.7 - 10.5 mg/dL    Total Protein 7.7 6.0 - 8.4 g/dL    Albumin 4.2 3.5 - 5.2 g/dL    Total Bilirubin 0.3 0.1 - 1.0 mg/dL    Alkaline Phosphatase 106 55 - 135 U/L    AST 15 10 - 40 U/L    ALT 8 (L) 10 - 44 U/L    eGFR >60 >60 mL/min/1.73 m^2    Anion Gap 12 8 - 16 mmol/L   Lactic acid, plasma #1   Result Value Ref Range    Lactate (Lactic Acid) 1.0 0.5 - 2.2 mmol/L   Urinalysis, Reflex to Urine Culture Urine, Clean Catch    Specimen: Urine   Result Value Ref Range    Specimen UA Urine, Clean Catch     Color, UA Colorless (A) Yellow, Straw, Trinity    Appearance, UA Clear Clear    pH, UA 6.0 5.0 - 8.0    Specific Gravity, UA 1.010 1.005 - 1.030    Protein, UA Negative Negative    Glucose, UA Negative Negative    Ketones, UA Negative Negative    Bilirubin (UA) Negative Negative    Occult Blood UA Negative Negative    Nitrite, UA Negative Negative    Urobilinogen, UA Negative <2.0 EU/dL    Leukocytes, UA Negative Negative   EKG 12-lead   Result Value Ref Range    QRS  Duration 76 ms    [facility] QTC Calculation 433 ms         Imaging Results:  Imaging Results              X-Ray Chest AP Portable (Final result)  Result time 04/20/24 15:43:27      Final result by Christina Salinas MD (04/20/24 15:43:27)                   Impression:      Stable chest exam no acute finding      Electronically signed by: Christina Salinas  Date:    04/20/2024  Time:    15:43               Narrative:    EXAMINATION:  XR CHEST AP PORTABLE    CLINICAL HISTORY:  Sepsis;    TECHNIQUE:  Single frontal portable view of the chest was performed.    COMPARISON:  04/15/2024    FINDINGS:  Lungs well aerated unchanged.  No sizable pleural effusion or convincing pneumothorax                                       The EKG was ordered, reviewed, and independently interpreted by the ED provider.  Interpretation time: 15:57  Rate: 90 BPM  Rhythm: normal sinus rhythm  Interpretation: No acute ST changes. No STEMI.           The Emergency Provider reviewed the vital signs and test results, which are outlined above.     ED Discussion     5:14 PM: Reassessed pt at this time.  Discussed with pt all pertinent ED information and results. Discussed pt dx and plan of tx. Gave pt all f/u and return to the ED instructions. All questions and concerns were addressed at this time. Pt expresses understanding of information and instructions, and is comfortable with plan to discharge. Pt is stable for discharge.    I discussed with patient and/or family/caretaker that evaluation in the ED does not suggest any emergent or life threatening medical conditions requiring immediate intervention beyond what was provided in the ED, and I believe patient is safe for discharge.  Regardless, an unremarkable evaluation in the ED does not preclude the development or presence of a serious of life threatening condition. As such, patient was instructed to return immediately for any worsening or change in current symptoms.         Medical Decision  Making  Amount and/or Complexity of Data Reviewed  Labs: ordered. Decision-making details documented in ED Course.  Radiology: ordered. Decision-making details documented in ED Course.  ECG/medicine tests: ordered and independent interpretation performed. Decision-making details documented in ED Course.                ED Medication(s):  Medications - No data to display    Discharge Medication List as of 4/20/2024  5:09 PM           Follow-up Information       Schedule an appointment as soon as possible for a visit  with Mo David MD.    Specialty: Internal Medicine  Why: As needed  Contact information:  NEEDS NEW LOCATION  [place] [id number]  [phone]                                 Scribe Attestation:   Scribe #1: I performed the above scribed service and the documentation accurately describes the services I performed. I attest to the accuracy of the note.     Attending:   Physician Attestation Statement for Scribe #1: I, Soraya Mancilla MD, personally performed the services described in this documentation, as scribed by Merlin Duncan, in my presence, and it is both accurate and complete.           Clinical Impression       ICD-10-CM ICD-9-CM   1. Subjective fever  R50.9 780.60   2. Sepsis  A41.9 038.9     995.91   3. Anxiety  F41.9 300.00       Disposition:   Disposition: Discharged  Condition: Stable         Soraya Mancilla MD  04/21/24 [id number]

## 2024-04-21 LAB
OHS QRS DURATION: 76 MS
OHS QTC CALCULATION: 433 MS

## 2024-04-25 LAB
BACTERIA BLD CULT: NORMAL
BACTERIA BLD CULT: NORMAL